# Patient Record
Sex: FEMALE | Race: WHITE | NOT HISPANIC OR LATINO | Employment: FULL TIME | URBAN - METROPOLITAN AREA
[De-identification: names, ages, dates, MRNs, and addresses within clinical notes are randomized per-mention and may not be internally consistent; named-entity substitution may affect disease eponyms.]

---

## 2017-03-03 ENCOUNTER — HOSPITAL ENCOUNTER (EMERGENCY)
Facility: HOSPITAL | Age: 52
Discharge: HOME | End: 2017-03-03

## 2017-03-03 VITALS
OXYGEN SATURATION: 92 % | BODY MASS INDEX: 27.31 KG/M2 | TEMPERATURE: 98 F | RESPIRATION RATE: 20 BRPM | SYSTOLIC BLOOD PRESSURE: 122 MMHG | WEIGHT: 160 LBS | HEART RATE: 70 BPM | DIASTOLIC BLOOD PRESSURE: 76 MMHG | HEIGHT: 64 IN

## 2017-03-03 DIAGNOSIS — S22.42XA CLOSED FRACTURE OF MULTIPLE RIBS OF LEFT SIDE, INITIAL ENCOUNTER: Primary | ICD-10-CM

## 2017-03-03 DIAGNOSIS — R07.81 RIB PAIN ON LEFT SIDE: ICD-10-CM

## 2017-03-03 PROCEDURE — 99283 EMERGENCY DEPT VISIT LOW MDM: CPT

## 2017-03-03 PROCEDURE — 25000003 PHARM REV CODE 250: Performed by: PHYSICIAN ASSISTANT

## 2017-03-03 RX ORDER — HYDROCODONE BITARTRATE AND ACETAMINOPHEN 5; 325 MG/1; MG/1
1 TABLET ORAL EVERY 6 HOURS PRN
Qty: 12 TABLET | Refills: 0 | Status: ON HOLD | OUTPATIENT
Start: 2017-03-03 | End: 2017-09-21 | Stop reason: HOSPADM

## 2017-03-03 RX ORDER — HYDROCODONE BITARTRATE AND ACETAMINOPHEN 10; 325 MG/1; MG/1
1 TABLET ORAL
Status: COMPLETED | OUTPATIENT
Start: 2017-03-03 | End: 2017-03-03

## 2017-03-03 RX ADMIN — HYDROCODONE BITARTRATE AND ACETAMINOPHEN 1 TABLET: 10; 325 TABLET ORAL at 12:03

## 2017-03-03 NOTE — ED PROVIDER NOTES
Encounter Date: 3/3/2017       History     Chief Complaint   Patient presents with    Rib Injury     states fell from 4th step and landed on left side. states she think she broke a rib     Review of patient's allergies indicates:  No Known Allergies  Patient is a 52 y.o. female presenting with the following complaint: fall. The history is provided by the patient.   Fall   The accident occurred just prior to arrival. The fall occurred while walking. She fell from a height of 1 to 2 ft. She landed on concrete. There was no blood loss. The pain is present in the chest. The pain is at a severity of 3/10. She was ambulatory at the scene. There was no entrapment after the fall. There was no drug use involved in the accident. There was no alcohol use involved in the accident. Pertinent negatives include no neck pain, no back pain, no paresthesias, no paralysis, no visual change, no fever, no numbness, no abdominal pain, no bowel incontinence, no nausea, no vomiting, no hematuria, no headaches, no hearing loss, no loss of consciousness and no tingling. The symptoms are aggravated by activity and pressure on the injury.     History reviewed. No pertinent past medical history.  Past Surgical History:   Procedure Laterality Date    HYSTERECTOMY      MANDIBLE FRACTURE SURGERY       No family history on file.  Social History   Substance Use Topics    Smoking status: Current Every Day Smoker     Packs/day: 1.00    Smokeless tobacco: None    Alcohol use Yes     Review of Systems   Constitutional: Negative for fever.   HENT: Negative for sore throat.    Eyes: Negative for photophobia and redness.   Respiratory: Negative for shortness of breath.    Cardiovascular: Negative for chest pain.   Gastrointestinal: Negative for abdominal pain, bowel incontinence, nausea and vomiting.   Endocrine: Negative for polydipsia and polyphagia.   Genitourinary: Negative for dysuria and hematuria.   Musculoskeletal: Negative for back pain and  "neck pain.   Skin: Negative for rash.   Neurological: Negative for tingling, loss of consciousness, weakness, numbness, headaches and paresthesias.   Hematological: Does not bruise/bleed easily.   Psychiatric/Behavioral: The patient is not nervous/anxious.    All other systems reviewed and are negative.      Physical Exam   Initial Vitals   BP Pulse Resp Temp SpO2   03/03/17 1222 03/03/17 1222 03/03/17 1222 03/03/17 1222 --   122/76 70 20 98 °F (36.7 °C)    No results found for this or any previous visit.   Vitals:    03/03/17 1222 03/03/17 1405   BP: 122/76    Pulse: 70    Resp: 20    Temp: 98 °F (36.7 °C)    TempSrc: Oral    SpO2:  (!) 92%   Weight: 72.6 kg (160 lb)    Height: 5' 4" (1.626 m)      Physical Exam    Nursing note and vitals reviewed.  Constitutional: Vital signs are normal. She appears well-developed and well-nourished. No distress.   HENT:   Head: Normocephalic and atraumatic.   Right Ear: External ear normal.   Left Ear: External ear normal.   Nose: Nose normal.   Mouth/Throat: Oropharynx is clear and moist.   Eyes: Conjunctivae, EOM and lids are normal. Pupils are equal, round, and reactive to light.   Neck: Normal range of motion and full passive range of motion without pain. Neck supple.   Cardiovascular: Normal rate, regular rhythm, S1 normal, S2 normal, normal heart sounds, intact distal pulses and normal pulses.   Pulmonary/Chest: Breath sounds normal. No respiratory distress. She has no wheezes. She has no rales. She exhibits tenderness and bony tenderness. She exhibits no laceration, no crepitus, no edema, no deformity, no swelling and no retraction.   Abdominal: Soft. Normal appearance and bowel sounds are normal. She exhibits no distension. There is no tenderness.   Musculoskeletal: Normal range of motion.   Lymphadenopathy:     She has no cervical adenopathy.   Neurological: She is alert and oriented to person, place, and time. She has normal strength. No cranial nerve deficit or sensory " deficit. Coordination and gait normal.   Skin: Skin is warm, dry and intact.   Psychiatric: She has a normal mood and affect. Her speech is normal and behavior is normal. Judgment and thought content normal. Cognition and memory are normal.         ED Course   Procedures  Labs Reviewed - No data to display                       Attending Attestation:     Physician Attestation Statement for NP/PA:   I discussed this assessment and plan of this patient with the NP/PA, but I did not personally examine the patient. The face to face encounter was performed by the NP/PA.                  ED Course     Clinical Impression:   The primary encounter diagnosis was Closed fracture of multiple ribs of left side, initial encounter. A diagnosis of Rib pain on left side was also pertinent to this visit.    Disposition:   Disposition: Discharged  Condition: Stable       LISA Aguirre  03/03/17 6374       Ángel Aguilera MD  03/03/17 9725

## 2017-03-03 NOTE — DISCHARGE INSTRUCTIONS
Rib Fracture    You broke one or more ribs. This is called a rib fracture. Rib fractures do not require a cast like other bones. They will heal by themselves in about 4-6 weeks. The first 3-4 weeks will be the most painful because deep breathing, coughing, or changing position from sitting to lying down, may cause the broken ends to move slightly.  Home care  · Rest. You should not be doing any heavy lifting or strenuous exertion until the pain goes away.  · It hurts to breathe when you have a broken rib. This puts you at risk of getting pneumonia from poor airflow through your lungs. To prevent this:  ¨ Take several very deep breaths once an hour while you're awake. Exhale through pursed lips as if you are blowing up a balloon. If possible, actually blow up a balloon or a rubber glove. This exercise builds up pressure inside the lung and prevents collapse of the small air sacs of the lung. This exercise may cause some pain at the site of injury, which is normal.  ¨ You may have gotten a breathing exercise device called an incentive spirometer. Use it at least 4 times a day, or as directed.  · Apply an ice pack over the injured area for 15 to 20 minutes every 1 to 2 hours. You should do this for the first 24 to 48 hours. You can make an ice pack by filling a plastic bag that seals at the top with ice cubes and then wrapping it with a thin towel. Continue with ice packs as needed for the relief of pain and swelling.  · You may use over-the-counter pain medicine to control pain, unless another pain medicine was prescribed. If you have chronic liver or kidney disease or ever had a stomach ulcer or GI bleeding, talk with your healthcare provider before using these medicines.  · If your pain is not controlled, contact your healthcare provider. Sometimes a stronger pain medicine may be needed. A nerve block can be done in case of severe pain. It will numb the nerve between the ribs.  Follow-up care  Follow up with  your healthcare provider, or as advised. Rarely, a broken rib will cause complications within the first few days that may not be evident during your initial exam. This can include collapsed lung, bleeding around the lung or into the abdomen, or pneumonia. Therefore, watch for the signs below.  If X-rays were taken, you will be told of any new findings that may affect your care.  Call 911  Call 911 if you have:  · Dizziness, weakness or fainting  · Shortness of breath with or without chest discomfort  · New or worsening abdominal pain  · Discomfort in other areas of your upper body such as your shoulders, jaw, neck, or arms  When to seek medical advice  Call your healthcare provider right away if any of these occur:  · Increasing chest pain with breathing  · Fever of 100.4°F (38°C) or above lasting for 24 to 48 hours  · Congested cough  Date Last Reviewed: 12/3/2015  © 1052-8725 [employer]. [address] [place] [address], [place] [address]. All rights reserved. This information is not intended as a substitute for professional medical care. Always follow your healthcare professional's instructions.

## 2017-03-03 NOTE — ED NOTES
Patient identifiers verified and correct for Susanne John.    LOC: The patient is awake, alert and aware of environment with an appropriate affect, the patient is oriented x 3 and speaking appropriately.  APPEARANCE: Patient resting comfortably and in no acute distress, patient is clean and well groomed, patient's clothing is properly fastened.  SKIN: The skin is warm and dry, color consistent with ethnicity, patient has normal skin turgor and moist mucus membranes, skin intact, no breakdown or bruising noted.  MUSCULOSKELETAL: Patient moving all extremities spontaneously, left sided rib pain   RESPIRATORY: Airway is open and patent, respirations are spontaneous.  CARDIAC: Patient has a normal rate, no periphreal edema noted, capillary refill < 3 seconds.  ABDOMEN: Soft and non tender to palpation.

## 2017-03-03 NOTE — ED AVS SNAPSHOT
[facility]  [address] [facility] [address]               Susanne John   3/3/2017 12:39 PM   ED    Description:  Female : 1965   Department:  [facility]            Your Care was Coordinated By:     Provider Role From To    LISA Aguirre Physician Assistant 17 0424 --      Reason for Visit     Rib Injury           Diagnoses this Visit        Comments    Closed fracture of multiple ribs of left side, initial encounter    -  Primary     Rib pain on left side           ED Disposition     ED Disposition Condition Comment    Discharge             To Do List           Follow-up Information     Follow up with Primary Doctor No In 1 week(s).       These Medications        Disp Refills Start End    hydrocodone-acetaminophen 5-325mg (NORCO) 5-325 mg per tablet 12 tablet 0 3/3/2017     Take 1 tablet by mouth every 6 (six) hours as needed for Pain. - Oral      [facility]     [facility] -  Assistance  Registered nurses in the [facility] provide clinical advisement, health education, appointment booking, and other advisory services.  Call for this free service at [phone].             Medications           START taking these NEW medications        Refills    hydrocodone-acetaminophen 5-325mg (NORCO) 5-325 mg per tablet 0    Sig: Take 1 tablet by mouth every 6 (six) hours as needed for Pain.    Class: Print    Route: Oral      These medications were administered today        Dose Freq    hydrocodone-acetaminophen 10-325mg per tablet 1 tablet 1 tablet ED 1 Time    Sig: Take 1 tablet by mouth ED 1 Time.    Class: Normal    Route: Oral    Cosign for Ordering: Required by Ángel Aguilera MD           Verify that the below list of medications is an accurate representation of the medications you are currently taking.  If none reported, the list may be blank. If incorrect, please contact your  "healthcare provider. Carry this list with you in case of emergency.           Current Medications     diclofenac (VOLTAREN) 50 MG EC tablet Take 1 tablet (50 mg total) by mouth 3 (three) times daily as needed.    hydrocodone-acetaminophen 5-325mg (NORCO) 5-325 mg per tablet Take 1 tablet by mouth every 6 (six) hours as needed for Pain.           Clinical Reference Information           Your Vitals Were     BP Pulse Temp Resp Height Weight    122/76 (BP Location: Right arm, Patient Position: Sitting) 70 98 °F (36.7 °C) (Oral) 20 5' 4" (1.626 m) 72.6 kg (160 lb)    BMI                27.46 kg/m2          Allergies as of 3/3/2017     No Known Allergies      Immunizations Administered on Date of Encounter - 3/3/2017     None      ED Micro, Lab, POCT     None      ED Imaging Orders     Start Ordered       Status Ordering Provider    03/03/17 1241 03/03/17 1240  X-Ray Ribs 2 View Left  1 time imaging      Final result         Discharge Instructions           Rib Fracture    You broke one or more ribs. This is called a rib fracture. Rib fractures do not require a cast like other bones. They will heal by themselves in about 4-6 weeks. The first 3-4 weeks will be the most painful because deep breathing, coughing, or changing position from sitting to lying down, may cause the broken ends to move slightly.  Home care  · Rest. You should not be doing any heavy lifting or strenuous exertion until the pain goes away.  · It hurts to breathe when you have a broken rib. This puts you at risk of getting pneumonia from poor airflow through your lungs. To prevent this:  ¨ Take several very deep breaths once an hour while you're awake. Exhale through pursed lips as if you are blowing up a balloon. If possible, actually blow up a balloon or a rubber glove. This exercise builds up pressure inside the lung and prevents collapse of the small air sacs of the lung. This exercise may cause some pain at the site of injury, which is " normal.  ¨ You may have gotten a breathing exercise device called an incentive spirometer. Use it at least 4 times a day, or as directed.  · Apply an ice pack over the injured area for 15 to 20 minutes every 1 to 2 hours. You should do this for the first 24 to 48 hours. You can make an ice pack by filling a plastic bag that seals at the top with ice cubes and then wrapping it with a thin towel. Continue with ice packs as needed for the relief of pain and swelling.  · You may use over-the-counter pain medicine to control pain, unless another pain medicine was prescribed. If you have chronic liver or kidney disease or ever had a stomach ulcer or GI bleeding, talk with your healthcare provider before using these medicines.  · If your pain is not controlled, contact your healthcare provider. Sometimes a stronger pain medicine may be needed. A nerve block can be done in case of severe pain. It will numb the nerve between the ribs.  Follow-up care  Follow up with your healthcare provider, or as advised. Rarely, a broken rib will cause complications within the first few days that may not be evident during your initial exam. This can include collapsed lung, bleeding around the lung or into the abdomen, or pneumonia. Therefore, watch for the signs below.  If X-rays were taken, you will be told of any new findings that may affect your care.  Call 911  Call 911 if you have:  · Dizziness, weakness or fainting  · Shortness of breath with or without chest discomfort  · New or worsening abdominal pain  · Discomfort in other areas of your upper body such as your shoulders, jaw, neck, or arms  When to seek medical advice  Call your healthcare provider right away if any of these occur:  · Increasing chest pain with breathing  · Fever of 100.4°F (38°C) or above lasting for 24 to 48 hours  · Congested cough  Date Last Reviewed: 12/3/2015  © 8666-1708 [employer]. [address] [place] [address], [place] [address]. All rights  reserved. This information is not intended as a substitute for professional medical care. Always follow your healthcare professional's instructions.          [facility] Sign-Up     Activating your [facility] account is as easy as 1-2-3!     1) Visit [email or web address], select Sign Up Now, enter this activation code and your date of birth, then select Next.  [email or web address]  Expires: 4/17/2017  1:41 PM      2) Create a username and password to use when you visit [facility] in the future and select a security question in case you lose your password and select Next.    3) Enter your e-mail address and click Sign Up!    Additional Information  If you have questions, please e-mail [email or web address] or call [phone] to talk to our [facility] staff. Remember, [facility] is NOT to be used for urgent needs. For medical emergencies, dial 911.          [facility] complies with applicable Federal civil rights laws and does not discriminate on the basis of race, color, national origin, age, disability, or sex.        Language Assistance Services     ATTENTION: Language assistance services are available, free of charge. Please call [phone].      ATENCIÓN: Si habla usama, tiene a khan disposición servicios gratuitos de asistencia lingüística. Llame al [phone].     CHÚ Ý: N?u b?n nói Ti?ng Vi?t, có các d?ch v? h? tr? ngôn ng? mi?n phí dành cho b?n. G?i s? [phone].

## 2017-09-21 ENCOUNTER — TELEPHONE (OUTPATIENT)
Dept: ORTHOPEDICS | Facility: CLINIC | Age: 52
End: 2017-09-21

## 2017-09-21 ENCOUNTER — ANESTHESIA (OUTPATIENT)
Dept: SURGERY | Facility: HOSPITAL | Age: 52
End: 2017-09-21
Payer: MEDICAID

## 2017-09-21 ENCOUNTER — ANESTHESIA EVENT (OUTPATIENT)
Dept: SURGERY | Facility: HOSPITAL | Age: 52
End: 2017-09-21
Payer: MEDICAID

## 2017-09-21 ENCOUNTER — HOSPITAL ENCOUNTER (OUTPATIENT)
Facility: HOSPITAL | Age: 52
Discharge: HOME | End: 2017-09-21
Attending: EMERGENCY MEDICINE | Admitting: ORTHOPAEDIC SURGERY
Payer: MEDICAID
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
## 2017-09-21 DIAGNOSIS — F10.10 ETOH ABUSE: ICD-10-CM

## 2017-09-21 DIAGNOSIS — T14.8XXA OPEN FRACTURE: Primary | ICD-10-CM

## 2017-09-21 LAB
ALBUMIN SERPL BCP-MCNC: 4.2 G/DL
ALP SERPL-CCNC: 43 U/L
ALT SERPL W/O P-5'-P-CCNC: 27 U/L
ANION GAP SERPL CALC-SCNC: 11 MMOL/L
APTT BLDCRRT: 25.9 SEC
AST SERPL-CCNC: 29 U/L
BASOPHILS # BLD AUTO: 0.03 K/UL
BASOPHILS NFR BLD: 0.4 %
BILIRUB SERPL-MCNC: 0.4 MG/DL
BUN SERPL-MCNC: 6 MG/DL
CALCIUM SERPL-MCNC: 9.9 MG/DL
CHLORIDE SERPL-SCNC: 101 MMOL/L
CO2 SERPL-SCNC: 26 MMOL/L
CREAT SERPL-MCNC: 0.7 MG/DL
DIFFERENTIAL METHOD: ABNORMAL
EOSINOPHIL # BLD AUTO: 0.1 K/UL
EOSINOPHIL NFR BLD: 2 %
ERYTHROCYTE [DISTWIDTH] IN BLOOD BY AUTOMATED COUNT: 14.2 %
EST. GFR  (AFRICAN AMERICAN): >60 ML/MIN/1.73 M^2
EST. GFR  (NON AFRICAN AMERICAN): >60 ML/MIN/1.73 M^2
ETHANOL SERPL-MCNC: 271 MG/DL
GLUCOSE SERPL-MCNC: 92 MG/DL
HCT VFR BLD AUTO: 45.1 %
HGB BLD-MCNC: 15.3 G/DL
INR PPP: 1
LYMPHOCYTES # BLD AUTO: 3.7 K/UL
LYMPHOCYTES NFR BLD: 51.5 %
MCH RBC QN AUTO: 33.3 PG
MCHC RBC AUTO-ENTMCNC: 33.9 G/DL
MCV RBC AUTO: 98 FL
MONOCYTES # BLD AUTO: 0.5 K/UL
MONOCYTES NFR BLD: 7.1 %
NEUTROPHILS # BLD AUTO: 2.8 K/UL
NEUTROPHILS NFR BLD: 39 %
PLATELET # BLD AUTO: 304 K/UL
PMV BLD AUTO: 10 FL
POTASSIUM SERPL-SCNC: 4 MMOL/L
PROT SERPL-MCNC: 7.8 G/DL
PROTHROMBIN TIME: 10.1 SEC
RBC # BLD AUTO: 4.6 M/UL
SODIUM SERPL-SCNC: 138 MMOL/L
WBC # BLD AUTO: 7.09 K/UL

## 2017-09-21 PROCEDURE — 80320 DRUG SCREEN QUANTALCOHOLS: CPT

## 2017-09-21 PROCEDURE — 99285 EMERGENCY DEPT VISIT HI MDM: CPT | Mod: 25

## 2017-09-21 PROCEDURE — 85730 THROMBOPLASTIN TIME PARTIAL: CPT

## 2017-09-21 PROCEDURE — 85610 PROTHROMBIN TIME: CPT

## 2017-09-21 PROCEDURE — 63600175 PHARM REV CODE 636 W HCPCS: Performed by: EMERGENCY MEDICINE

## 2017-09-21 PROCEDURE — 90375 RABIES IG IM/SC: CPT | Performed by: EMERGENCY MEDICINE

## 2017-09-21 PROCEDURE — 36000706: Performed by: ORTHOPAEDIC SURGERY

## 2017-09-21 PROCEDURE — 90675 RABIES VACCINE IM: CPT | Performed by: EMERGENCY MEDICINE

## 2017-09-21 PROCEDURE — 85025 COMPLETE CBC W/AUTO DIFF WBC: CPT

## 2017-09-21 PROCEDURE — 71000039 HC RECOVERY, EACH ADD'L HOUR: Performed by: ORTHOPAEDIC SURGERY

## 2017-09-21 PROCEDURE — 36000707: Performed by: ORTHOPAEDIC SURGERY

## 2017-09-21 PROCEDURE — G0378 HOSPITAL OBSERVATION PER HR: HCPCS

## 2017-09-21 PROCEDURE — 90471 IMMUNIZATION ADMIN: CPT | Performed by: EMERGENCY MEDICINE

## 2017-09-21 PROCEDURE — 37000008 HC ANESTHESIA 1ST 15 MINUTES: Performed by: ORTHOPAEDIC SURGERY

## 2017-09-21 PROCEDURE — 71000016 HC POSTOP RECOV ADDL HR: Performed by: ORTHOPAEDIC SURGERY

## 2017-09-21 PROCEDURE — 25000003 PHARM REV CODE 250: Performed by: EMERGENCY MEDICINE

## 2017-09-21 PROCEDURE — 96365 THER/PROPH/DIAG IV INF INIT: CPT

## 2017-09-21 PROCEDURE — 71000033 HC RECOVERY, INTIAL HOUR: Performed by: ORTHOPAEDIC SURGERY

## 2017-09-21 PROCEDURE — 90472 IMMUNIZATION ADMIN EACH ADD: CPT | Performed by: EMERGENCY MEDICINE

## 2017-09-21 PROCEDURE — S0020 INJECTION, BUPIVICAINE HYDRO: HCPCS | Performed by: ORTHOPAEDIC SURGERY

## 2017-09-21 PROCEDURE — 37000009 HC ANESTHESIA EA ADD 15 MINS: Performed by: ORTHOPAEDIC SURGERY

## 2017-09-21 PROCEDURE — 63600175 PHARM REV CODE 636 W HCPCS: Performed by: ORTHOPAEDIC SURGERY

## 2017-09-21 PROCEDURE — 90715 TDAP VACCINE 7 YRS/> IM: CPT | Performed by: EMERGENCY MEDICINE

## 2017-09-21 PROCEDURE — 63600175 PHARM REV CODE 636 W HCPCS: Performed by: NURSE ANESTHETIST, CERTIFIED REGISTERED

## 2017-09-21 PROCEDURE — 80053 COMPREHEN METABOLIC PANEL: CPT

## 2017-09-21 PROCEDURE — 25000003 PHARM REV CODE 250: Performed by: ORTHOPAEDIC SURGERY

## 2017-09-21 PROCEDURE — 25000003 PHARM REV CODE 250: Performed by: NURSE ANESTHETIST, CERTIFIED REGISTERED

## 2017-09-21 PROCEDURE — 96372 THER/PROPH/DIAG INJ SC/IM: CPT

## 2017-09-21 PROCEDURE — 63600175 PHARM REV CODE 636 W HCPCS: Performed by: ANESTHESIOLOGY

## 2017-09-21 PROCEDURE — 71000015 HC POSTOP RECOV 1ST HR: Performed by: ORTHOPAEDIC SURGERY

## 2017-09-21 PROCEDURE — 26727 TREAT FINGER FRACTURE EACH: CPT | Mod: 51,F4,, | Performed by: ORTHOPAEDIC SURGERY

## 2017-09-21 PROCEDURE — 26418 REPAIR FINGER TENDON: CPT | Mod: F4,,, | Performed by: ORTHOPAEDIC SURGERY

## 2017-09-21 PROCEDURE — C1769 GUIDE WIRE: HCPCS | Performed by: ORTHOPAEDIC SURGERY

## 2017-09-21 DEVICE — WIRE C TROCAR TIP .045
Type: IMPLANTABLE DEVICE | Site: HAND | Status: NON-FUNCTIONAL
Removed: 2017-11-06

## 2017-09-21 RX ORDER — HYDROCODONE BITARTRATE AND ACETAMINOPHEN 10; 325 MG/1; MG/1
1 TABLET ORAL EVERY 4 HOURS PRN
Qty: 42 TABLET | Refills: 0 | Status: SHIPPED | OUTPATIENT
Start: 2017-09-21 | End: 2017-10-01

## 2017-09-21 RX ORDER — DOXYCYCLINE HYCLATE 100 MG
100 TABLET ORAL EVERY 12 HOURS
Qty: 40 TABLET | Refills: 0 | Status: ON HOLD | OUTPATIENT
Start: 2017-09-21 | End: 2017-11-06 | Stop reason: ALTCHOICE

## 2017-09-21 RX ORDER — NEOMYCIN AND POLYMYXIN B SULFATES 40; 200000 MG/ML; [USP'U]/ML
SOLUTION IRRIGATION
Status: DISCONTINUED | OUTPATIENT
Start: 2017-09-21 | End: 2017-09-21 | Stop reason: HOSPADM

## 2017-09-21 RX ORDER — SODIUM CHLORIDE, SODIUM LACTATE, POTASSIUM CHLORIDE, CALCIUM CHLORIDE 600; 310; 30; 20 MG/100ML; MG/100ML; MG/100ML; MG/100ML
INJECTION, SOLUTION INTRAVENOUS CONTINUOUS PRN
Status: DISCONTINUED | OUTPATIENT
Start: 2017-09-21 | End: 2017-09-21

## 2017-09-21 RX ORDER — BUPIVACAINE HYDROCHLORIDE 5 MG/ML
INJECTION, SOLUTION EPIDURAL; INTRACAUDAL
Status: DISCONTINUED | OUTPATIENT
Start: 2017-09-21 | End: 2017-09-21 | Stop reason: HOSPADM

## 2017-09-21 RX ORDER — SODIUM CHLORIDE 9 MG/ML
INJECTION, SOLUTION INTRAVENOUS CONTINUOUS
Status: DISCONTINUED | OUTPATIENT
Start: 2017-09-21 | End: 2017-09-21 | Stop reason: HOSPADM

## 2017-09-21 RX ORDER — MIDAZOLAM HYDROCHLORIDE 1 MG/ML
INJECTION, SOLUTION INTRAMUSCULAR; INTRAVENOUS
Status: DISCONTINUED | OUTPATIENT
Start: 2017-09-21 | End: 2017-09-21

## 2017-09-21 RX ORDER — MUPIROCIN 20 MG/G
1 OINTMENT TOPICAL
Status: DISCONTINUED | OUTPATIENT
Start: 2017-09-21 | End: 2017-09-21 | Stop reason: HOSPADM

## 2017-09-21 RX ORDER — HYDROCODONE BITARTRATE AND ACETAMINOPHEN 5; 325 MG/1; MG/1
1 TABLET ORAL EVERY 4 HOURS PRN
Status: DISCONTINUED | OUTPATIENT
Start: 2017-09-21 | End: 2017-09-21 | Stop reason: HOSPADM

## 2017-09-21 RX ORDER — BACITRACIN 50000 [IU]/1
INJECTION, POWDER, FOR SOLUTION INTRAMUSCULAR
Status: DISCONTINUED | OUTPATIENT
Start: 2017-09-21 | End: 2017-09-21 | Stop reason: HOSPADM

## 2017-09-21 RX ORDER — PHENYLEPHRINE HYDROCHLORIDE 10 MG/ML
INJECTION INTRAVENOUS
Status: DISCONTINUED | OUTPATIENT
Start: 2017-09-21 | End: 2017-09-21

## 2017-09-21 RX ORDER — FENTANYL CITRATE 50 UG/ML
INJECTION, SOLUTION INTRAMUSCULAR; INTRAVENOUS
Status: DISCONTINUED | OUTPATIENT
Start: 2017-09-21 | End: 2017-09-21

## 2017-09-21 RX ORDER — SODIUM CHLORIDE 0.9 % (FLUSH) 0.9 %
3 SYRINGE (ML) INJECTION
Status: DISCONTINUED | OUTPATIENT
Start: 2017-09-21 | End: 2017-09-21 | Stop reason: HOSPADM

## 2017-09-21 RX ORDER — AMOXICILLIN AND CLAVULANATE POTASSIUM 875; 125 MG/1; MG/1
1 TABLET, FILM COATED ORAL
Status: COMPLETED | OUTPATIENT
Start: 2017-09-21 | End: 2017-09-21

## 2017-09-21 RX ORDER — PROPOFOL 10 MG/ML
INJECTION, EMULSION INTRAVENOUS
Status: DISCONTINUED | OUTPATIENT
Start: 2017-09-21 | End: 2017-09-21

## 2017-09-21 RX ORDER — SUCCINYLCHOLINE CHLORIDE 20 MG/ML
INJECTION INTRAMUSCULAR; INTRAVENOUS
Status: DISCONTINUED | OUTPATIENT
Start: 2017-09-21 | End: 2017-09-21

## 2017-09-21 RX ORDER — SODIUM CHLORIDE 0.9 % (FLUSH) 0.9 %
3 SYRINGE (ML) INJECTION EVERY 8 HOURS
Status: DISCONTINUED | OUTPATIENT
Start: 2017-09-21 | End: 2017-09-21 | Stop reason: HOSPADM

## 2017-09-21 RX ORDER — FENTANYL CITRATE 50 UG/ML
25 INJECTION, SOLUTION INTRAMUSCULAR; INTRAVENOUS EVERY 5 MIN PRN
Status: DISCONTINUED | OUTPATIENT
Start: 2017-09-21 | End: 2017-09-21 | Stop reason: HOSPADM

## 2017-09-21 RX ORDER — CEFAZOLIN SODIUM 1 G/3ML
INJECTION, POWDER, FOR SOLUTION INTRAMUSCULAR; INTRAVENOUS
Status: DISCONTINUED | OUTPATIENT
Start: 2017-09-21 | End: 2017-09-21 | Stop reason: HOSPADM

## 2017-09-21 RX ORDER — LIDOCAINE HYDROCHLORIDE 10 MG/ML
INJECTION, SOLUTION EPIDURAL; INFILTRATION; INTRACAUDAL; PERINEURAL
Status: DISCONTINUED | OUTPATIENT
Start: 2017-09-21 | End: 2017-09-21 | Stop reason: HOSPADM

## 2017-09-21 RX ORDER — LIDOCAINE HYDROCHLORIDE 10 MG/ML
10 INJECTION INFILTRATION; PERINEURAL
Status: COMPLETED | OUTPATIENT
Start: 2017-09-21 | End: 2017-09-21

## 2017-09-21 RX ORDER — HYDROMORPHONE HYDROCHLORIDE 2 MG/ML
0.2 INJECTION, SOLUTION INTRAMUSCULAR; INTRAVENOUS; SUBCUTANEOUS EVERY 5 MIN PRN
Status: DISCONTINUED | OUTPATIENT
Start: 2017-09-21 | End: 2017-09-21 | Stop reason: HOSPADM

## 2017-09-21 RX ORDER — LIDOCAINE HCL/PF 100 MG/5ML
SYRINGE (ML) INTRAVENOUS
Status: DISCONTINUED | OUTPATIENT
Start: 2017-09-21 | End: 2017-09-21

## 2017-09-21 RX ORDER — DEXAMETHASONE SODIUM PHOSPHATE 4 MG/ML
INJECTION, SOLUTION INTRA-ARTICULAR; INTRALESIONAL; INTRAMUSCULAR; INTRAVENOUS; SOFT TISSUE
Status: DISCONTINUED | OUTPATIENT
Start: 2017-09-21 | End: 2017-09-21

## 2017-09-21 RX ORDER — ONDANSETRON 2 MG/ML
4 INJECTION INTRAMUSCULAR; INTRAVENOUS DAILY PRN
Status: DISCONTINUED | OUTPATIENT
Start: 2017-09-21 | End: 2017-09-21 | Stop reason: HOSPADM

## 2017-09-21 RX ADMIN — MIDAZOLAM HYDROCHLORIDE 1 MG: 1 INJECTION, SOLUTION INTRAMUSCULAR; INTRAVENOUS at 07:09

## 2017-09-21 RX ADMIN — AMOXICILLIN AND CLAVULANATE POTASSIUM 1 TABLET: 875; 125 TABLET, FILM COATED ORAL at 04:09

## 2017-09-21 RX ADMIN — CLOSTRIDIUM TETANI TOXOID ANTIGEN (FORMALDEHYDE INACTIVATED), CORYNEBACTERIUM DIPHTHERIAE TOXOID ANTIGEN (FORMALDEHYDE INACTIVATED), BORDETELLA PERTUSSIS TOXOID ANTIGEN (GLUTARALDEHYDE INACTIVATED), BORDETELLA PERTUSSIS FILAMENTOUS HEMAGGLUTININ ANTIGEN (FORMALDEHYDE INACTIVATED), BORDETELLA PERTUSSIS PERTACTIN ANTIGEN, AND BORDETELLA PERTUSSIS FIMBRIAE 2/3 ANTIGEN 0.5 ML: 5; 2; 2.5; 5; 3; 5 INJECTION, SUSPENSION INTRAMUSCULAR at 04:09

## 2017-09-21 RX ADMIN — HYDROMORPHONE HYDROCHLORIDE 0.2 MG: 2 INJECTION, SOLUTION INTRAMUSCULAR; INTRAVENOUS; SUBCUTANEOUS at 09:09

## 2017-09-21 RX ADMIN — EPHEDRINE SULFATE 10 MG: 50 INJECTION, SOLUTION INTRAMUSCULAR; INTRAVENOUS; SUBCUTANEOUS at 08:09

## 2017-09-21 RX ADMIN — SODIUM CHLORIDE, SODIUM LACTATE, POTASSIUM CHLORIDE, AND CALCIUM CHLORIDE: 600; 310; 30; 20 INJECTION, SOLUTION INTRAVENOUS at 07:09

## 2017-09-21 RX ADMIN — RABIES IMMUNE GLOBULIN (HUMAN) 1185 UNITS: 150 INJECTION INTRAMUSCULAR at 05:09

## 2017-09-21 RX ADMIN — RABIES VACCINE 2.5 UNITS: KIT at 05:09

## 2017-09-21 RX ADMIN — LIDOCAINE HYDROCHLORIDE 10 ML: 10 INJECTION, SOLUTION INFILTRATION; PERINEURAL at 04:09

## 2017-09-21 RX ADMIN — FENTANYL CITRATE 25 MCG: 50 INJECTION, SOLUTION INTRAMUSCULAR; INTRAVENOUS at 08:09

## 2017-09-21 RX ADMIN — PIPERACILLIN SODIUM, TAZOBACTAM SODIUM 3.38 G: 3; .375 INJECTION, POWDER, LYOPHILIZED, FOR SOLUTION INTRAVENOUS at 05:09

## 2017-09-21 RX ADMIN — DEXAMETHASONE SODIUM PHOSPHATE 4 MG: 4 INJECTION, SOLUTION INTRA-ARTICULAR; INTRALESIONAL; INTRAMUSCULAR; INTRAVENOUS; SOFT TISSUE at 08:09

## 2017-09-21 RX ADMIN — EPHEDRINE SULFATE 20 MG: 50 INJECTION, SOLUTION INTRAMUSCULAR; INTRAVENOUS; SUBCUTANEOUS at 08:09

## 2017-09-21 RX ADMIN — PHENYLEPHRINE HYDROCHLORIDE 100 MCG: 10 INJECTION INTRAVENOUS at 08:09

## 2017-09-21 RX ADMIN — SUCCINYLCHOLINE CHLORIDE 120 MG: 20 INJECTION, SOLUTION INTRAMUSCULAR; INTRAVENOUS at 08:09

## 2017-09-21 RX ADMIN — PROPOFOL 20 MG: 10 INJECTION, EMULSION INTRAVENOUS at 08:09

## 2017-09-21 RX ADMIN — SODIUM CHLORIDE, SODIUM LACTATE, POTASSIUM CHLORIDE, AND CALCIUM CHLORIDE: 600; 310; 30; 20 INJECTION, SOLUTION INTRAVENOUS at 08:09

## 2017-09-21 RX ADMIN — LIDOCAINE HYDROCHLORIDE 80 MG: 20 INJECTION, SOLUTION INTRAVENOUS at 08:09

## 2017-09-21 RX ADMIN — MIDAZOLAM HYDROCHLORIDE 1 MG: 1 INJECTION, SOLUTION INTRAMUSCULAR; INTRAVENOUS at 08:09

## 2017-09-21 RX ADMIN — PROPOFOL 120 MG: 10 INJECTION, EMULSION INTRAVENOUS at 08:09

## 2017-09-21 RX ADMIN — SODIUM CHLORIDE: 0.9 INJECTION, SOLUTION INTRAVENOUS at 06:09

## 2017-09-21 RX ADMIN — PHENYLEPHRINE HYDROCHLORIDE 200 MCG: 10 INJECTION INTRAVENOUS at 08:09

## 2017-09-21 NOTE — DISCHARGE INSTRUCTIONS
General Information:    1.  Do not drink alcoholic beverages including beer for 24 hours or as long as you are on pain medication..  2.  Do not drive a motor vehicle, operate machinery or power tools, or signs legal papers for 24 hours or as long as you are on pain medication.   3.  You may experience light-headedness, dizziness, and sleepiness following surgery. Please do not stay alone. A responsible adult should be with you for this 24 hour period.  4.  Go home and rest.    5. Progress slowly to a normal diet unless instructed.  Otherwise, begin with liquids such as soft drinks, then soup and crackers working up to solid foods. Drink plenty of nonalcoholic fluids.  6.  Certain anesthetics and pain medications produce nausea and vomiting in certain       individuals. If nausea becomes a problem at home, call you doctor.    7. Several times every hour while you are awake, take 2-3 deep breaths and cough. If you had stomach surgery hold a pillow or rolled towel firmly against your stomach before you cough. This will help with any pain the cough might cause.    8. Several times every hour while you are awake, pump and flex your feet 5-6 times and do foot circles. This will help prevent blood clots.    9.Call your doctor for severe pain, bleeding, fever, or signs or symptoms of infection (pain, swelling, redness, foul odor, drainage).    10.You can contact your doctor anytime by [phone] for the [facility] (at [facility]) or [phone] for the [facility] on [facility].   [email or web address] is another way to contact your doctor if you are an active participant online with [facility].

## 2017-09-21 NOTE — TELEPHONE ENCOUNTER
Called pt to advise of post op appointment. Pt was unavailable. Left message for pt to call back at earliest convenience.

## 2017-09-21 NOTE — ED NOTES
Rabies immune globulin given via SQ around laceration per Dr. Devlin. Total of 1 ml given. 7ml left over. 3.5 ml IM given to right gluteus buddy. 3.5 ml IM given to left gluteus buddy. Patient tolerated well.

## 2017-09-21 NOTE — TRANSFER OF CARE
"Anesthesia Transfer of Care Note    Patient: Susanne John    Procedure(s) Performed: Procedure(s) (LRB):  REDUCTION WITH PINNING-CLOSED-FINGER (Left)  REPAIR- EXTENSOR TENDON (Left)  INCISION AND DRAINAGE (I & D)-HAND (Left)  REPAIR-LACERATION HAND (Left)    Patient location: PACU    Anesthesia Type: general    Transport from OR: Transported from OR on room air with adequate spontaneous ventilation    Post pain: adequate analgesia    Post assessment: no apparent anesthetic complications and tolerated procedure well    Post vital signs: stable    Level of consciousness: awake, alert and oriented    Nausea/Vomiting: no nausea/vomiting    Complications: none    Transfer of care protocol was followed      Last vitals:   Visit Vitals  /65 (BP Location: Left arm)   Pulse 77   Temp 36.7 °C (98 °F)   Resp 20   Ht 5' 2" (1.575 m)   Wt 59 kg (130 lb)   SpO2 98%   Breastfeeding? No   BMI 23.78 kg/m²     "

## 2017-09-21 NOTE — ANESTHESIA RELEASE NOTE
"Anesthesia Release from PACU Note    Patient: Susanne John    Procedure(s) Performed: Procedure(s) (LRB):  REDUCTION WITH PINNING-CLOSED-FINGER (Left)  REPAIR- EXTENSOR TENDON (Left)  INCISION AND DRAINAGE (I & D)-HAND (Left)  REPAIR-LACERATION HAND (Left)    Anesthesia type: general    Post pain: Adequate analgesia    Post assessment: no apparent anesthetic complications, tolerated procedure well and no evidence of recall    Last Vitals:   Visit Vitals  /86   Pulse 92   Temp 36.4 °C (97.5 °F) (Temporal)   Resp 12   Ht 5' 2" (1.575 m)   Wt 59 kg (130 lb)   SpO2 99%   Breastfeeding? No   BMI 23.78 kg/m²       Post vital signs: stable    Level of consciousness: awake, alert  and oriented    Nausea/Vomiting: no nausea/no vomiting    Complications: none    Airway Patency: patent    Respiratory: unassisted, spontaneous ventilation, room air    Cardiovascular: stable and blood pressure at baseline    Hydration: euvolemic  "

## 2017-09-21 NOTE — DISCHARGE SUMMARY
[facility]  Brief Operative Note     SUMMARY     Surgery Date: 9/21/2017     Surgeon(s) and Role:     * Florencio Huerta Sr., MD - Primary    Assisting Surgeon: None    Pre-op Diagnosis:  Open fracture [T14.8]  Dog bite, initial encounter [W54.0XXA]    Post-op Diagnosis:  Post-Op Diagnosis Codes:     * Open fracture [T14.8]     * Dog bite, initial encounter [W54.0XXA]  Left fifth finger proximal phalanx open fracture, laceration to the left hand measuring 8 cm. laceration to the fifth extensor tendon     Procedure(s) (LRB):  REDUCTION WITH PINNING-CLOSED-FINGER (Left)  REPAIR- EXTENSOR TENDON (Left)  INCISION AND DRAINAGE (I & D)-HAND (Left)  REPAIR-LACERATION HAND (Left)    Anesthesia: General    Description of the findings of the procedure:  Left fifth finger proximal phalanx open fracture, laceration to the left hand measuring 8 cm. laceration to the fifth extensor tendon     Findings/Key Components: Left fifth finger proximal phalanx open fracture, laceration to the left hand measuring 8 cm. laceration to the fifth extensor tendon      Estimated Blood Loss: 2 cc         Specimens:   Specimen (12h ago through future)    None          Discharge Note    SUMMARY     Admit Date: 9/21/2017    Discharge Date and Time:  09/21/2017 9:01 AM    Hospital Course (synopsis of major diagnoses, care, treatment, and services provided during the course of the hospital stay): The patient tolerated the procedure well.      Final Diagnosis: Post-Op Diagnosis Codes:     * Open fracture [T14.8]     * Dog bite, initial encounter [W54.0XXA]  Left fifth finger proximal phalanx open fracture, laceration to the left hand measuring 8 cm. laceration to the fifth extensor tendon     Disposition: Home or Self Care    Follow Up/Patient Instructions: Follow-up in 4 days.  Elevate the hand to 5 cm above the level of the heart.  Leave the dressings intact.  Resume regular diet.    Medications: Norco  Reconciled Home Medications:    Current Discharge Medication List      START taking these medications    Details   doxycycline (VIBRA-TABS) 100 MG tablet Take 1 tablet (100 mg total) by mouth every 12 (twelve) hours.  Qty: 40 tablet, Refills: 0      hydrocodone-acetaminophen 10-325mg (NORCO)  mg Tab Take 1 tablet by mouth every 4 (four) hours as needed.  Qty: 42 tablet, Refills: 0         CONTINUE these medications which have NOT CHANGED    Details   diclofenac (VOLTAREN) 50 MG EC tablet Take 1 tablet (50 mg total) by mouth 3 (three) times daily as needed.  Qty: 14 tablet, Refills: 0         STOP taking these medications       hydrocodone-acetaminophen 5-325mg (NORCO) 5-325 mg per tablet Comments:   Reason for Stopping:               Discharge Procedure Orders  Diet general     Call MD for:  temperature >100.4     Call MD for:  persistent nausea and vomiting     Call MD for:  severe uncontrolled pain     Call MD for:  difficulty breathing, headache or visual disturbances     Call MD for:  redness, tenderness, or signs of infection (pain, swelling, redness, odor or green/yellow discharge around incision site)     Call MD for:  hives     Call MD for:  persistent dizziness or light-headedness     Call MD for:  extreme fatigue     Other restrictions (specify):   Order Comments: Keep the splint in place.  Keep the splint clean and dry.  Elevate the hand to 5 cm above the level of the heart.  Follow up on Tuesday.     Leave dressing on - Keep it clean, dry, and intact until clinic visit       Follow-up Information     Call Florencio Huerta Sr, MD.    Specialty:  Orthopedic Surgery  Why:  As needed, If symptoms worsen, For suture removal, For wound re-check  Contact information:  [address] [facility] [place] [address] [phone]

## 2017-09-21 NOTE — PLAN OF CARE
Home care instructions given to patient and friend, verbalized understanding.  All questions answered to the satisfaction of patient and friend.  To POV via WC, into POV without difficulty, distress or assist.

## 2017-09-21 NOTE — PLAN OF CARE
Ambulated well in ed room. Transferred self from stretcher to stretcher without difficulty or weakness. Pt reports 10/10 pain level due to wash out of wounds in the ed. She will need to sign consents and verb understanding that she will feel better after surgery. She also requests  consult so she can apply for medicaid.

## 2017-09-21 NOTE — OP NOTE
OPERATIVE DICTATION:    DATE OF SERVICE: 9/21/17      Preoperative Diagnosis:  Left fifth finger proximal phalanx open fracture, laceration to the left hand measuring 8 cm.    Postoperative Diagnosis:  Left fifth finger proximal phalanx open fracture, laceration to the left hand measuring 8 cm. laceration to the fifth extensor tendon     Procedure: Left fifth proximal phalanx open reduction and percutaneous pin fixation, repair of complex laceration 8 cm, repair fifth extensor tendon    Indication for surgery: Left finger laceration and open fracture of the left proximal phalanx fifth digit.    Anesthesia: Gen. anesthesia     Complications: None     Surgeon: Florencio Huerta M.D.     Specimen: None    Assistant: None      Operative procedure:  The patient was brought to operating room after appropriate consent and placed under general anesthesia with intubation.  The timeout was performed and the correct extremity identified.  The left upper extremity was prepped with alcohol chlorhexidine and sterilely draped.  The Esmarch used for exsanguination and the tourniquet inflated to 250 mmHg pressure.  The patient had a 6 cm laceration over the ulnar aspect of the fifth digit as well as 31 cm laceration over the dorsal radial aspect of the fifth digit.  The pulse lavage with 1 L of TAB solution was used to irrigate the area.  Patient noted to have a small 1 cm tear of the extensor tendon of the fifth digit that was repaired using interrupted 3-0 Vicryl suture.  The fracture was reduced and the pins were placed through the skin across the fracture site under fluoroscopic guidance with the bone reduction forceps in place.  The quarter inch iodoform gauze was packed into the wound to allow drainage.  The wound was closed using interrupted 3-0 nylon sutures.  The pins were cut outside of the skin.  Betadine applied cast padding applied and the patient placed in a volar splint with the wrist in extension and the fifth digit  in extension.  The tourniquet deflated.                 Florencio Huerta M.D.

## 2017-09-21 NOTE — H&P
"CC:52 year old female complains of left hand pain.    HPI:She was bitten by a Pitbull.    PMH:  History reviewed. No pertinent past medical history.    PSH:    Past Surgical History:   Procedure Laterality Date    HYSTERECTOMY      MANDIBLE FRACTURE SURGERY         Family Hx:  History reviewed. No pertinent family history.    Allergy:  Review of patient's allergies indicates:  No Known Allergies    Medication:    Current Facility-Administered Medications:     0.9%  NaCl infusion, , Intravenous, Continuous, Rubin Marsh MD, Stopped at 09/21/17 0700    lorazepam (ATIVAN) injection 1 mg, 1 mg, Intravenous, Q2H PRN, Rubin Marsh MD    Current Outpatient Prescriptions:     diclofenac (VOLTAREN) 50 MG EC tablet, Take 1 tablet (50 mg total) by mouth 3 (three) times daily as needed., Disp: 14 tablet, Rfl: 0    hydrocodone-acetaminophen 5-325mg (NORCO) 5-325 mg per tablet, Take 1 tablet by mouth every 6 (six) hours as needed for Pain., Disp: 12 tablet, Rfl: 0    Social History:    Social History     Social History    Marital status: Single     Spouse name: N/A    Number of children: N/A    Years of education: N/A     Occupational History    Not on file.     Social History Main Topics    Smoking status: Current Every Day Smoker     Packs/day: 2.00    Smokeless tobacco: Not on file    Alcohol use Yes    Drug use: No    Sexual activity: Not on file     Other Topics Concern    Not on file     Social History Narrative    No narrative on file       Vitals:   /65 (BP Location: Left arm)   Pulse 77   Temp 98 °F (36.7 °C)   Resp 20   Ht 5' 2" (1.575 m)   Wt 59 kg (130 lb)   SpO2 98%   Breastfeeding? No   BMI 23.78 kg/m²      ROS:  GENERAL: No fever, chills, fatigability or weight loss.  SKIN: No rashes, itching or changes in color or texture of skin.  HEAD: No headaches or recent head trauma.  EYES: Visual acuity fine. No photophobia, ocular pain or diplopia.  EARS: Denies ear pain, " discharge or vertigo.  NOSE: No loss of smell, no epistaxis or postnasal drip.  MOUTH & THROAT: No hoarseness or change in voice. No excessive gum bleeding.  NODES: Denies swollen glands.  CHEST: Denies ORO, cyanosis, wheezing, cough and sputum production.  CARDIOVASCULAR: Denies chest pain, PND, orthopnea or reduced exercise tolerance.  ABDOMEN: Appetite fine. No weight loss. Denies diarrhea, abdominal pain, hematemesis or blood in stool.  URINARY: No flank pain, dysuria or hematuria.  PERIPHERAL VASCULAR: No claudication or cyanosis.  NEUROLOGIC: No history of seizures, paralysis, alteration of gait or coordination.  MUSCULOSKELETAL: See HPI    PE:  APPEARANCE: Well nourished, well developed, in no acute distress.   HEAD: Normocephalic, atraumatic.  EYES: PERRL. EOMI.   EARS: TM's intact. Light reflex normal. No retraction or perforation.   NOSE: Mucosa pink. Airway clear.  MOUTH & THROAT: No tonsillar enlargement. No pharyngeal erythema or exudate. No stridor.  NECK: Supple.   NODES: No cervical, axillary or inguinal lymph node enlargement.  CHEST: Lungs clear to auscultation.  CARDIOVASCULAR: Normal S1, S2. No rubs, murmurs or gallops.  ABDOMEN: Bowel sounds normal. Not distended. Soft. No tenderness or masses.  NEUROLOGIC: Cranial Nerves: II-XII grossly intact, also see MUSCULOSKELETAL  MUSCULOSKELETAL:          Left hand- Dressing intact, 2 plus radial  artery and ulnar artery pulses, light touch intact Left upper extremity.  All digits are warm. No erythema, no warmth, no drainage, No swelling, no significant tenderness. 6 cm laceration over the ulnar aspect of the fifth digit and a 1 cm laceration over the redial side of the fifth digit. Decreased light touch over the tip of the left fifth digit.            Assessment:           Diagnosis:              1.laceration of the left fifth digit with an open fracture of the proximal phalanx                   Diagnostic  Studies  MRI-No  X-Ray-No  EMG/NCV-No  Arthrogram-No  Bone Scan-No  CT Scan-No  Doppler-No  ESR-No  CRP-No  CBC with Diff-No   Rheumatoid/Arthritis Panel-No      Plan:                                                 1. PT-no                                                 2.OT-no                                          3.NSAID-no                                        4. Narcotics-no                                     5. Wound care-Yes                                 6. Rest-yes                                           7. Surgery-yes, irrigation and debridement of the left fifth finger with pinning of the fifth proximal phalanx                                         8. MAREN Hose-no                                    9. Anticoagulation therapy-no               10. Elevation-yes                                     11. Crutches-no                                    12. Walker-no             13. Cane no                        14. Referral-no                                     15.Injection-no                            16. Splint   /    Cast   /   Cast Shoe-Yes              17. RICE-none

## 2017-09-21 NOTE — ANESTHESIA PREPROCEDURE EVALUATION
09/21/2017  Susanne John is a 52 y.o., female.    Anesthesia Evaluation    I have reviewed the Patient Summary Reports.    I have reviewed the Nursing Notes.      Review of Systems  Anesthesia Hx:  No problems with previous Anesthesia    Social:  Smoker  Tobacco Use: Active smoker of cigarette for 30 years, 2 packs per day, Smoking Cessation discussion.       Physical Exam  General:  Well nourished    Airway/Jaw/Neck:  Airway Findings: Mouth Opening: Normal Mallampati: III  Improves to II with phonation.  TM Distance: Normal, at least 6 cm      Dental:  Dental Findings:    Chest/Lungs:  Chest/Lungs Findings: Normal Respiratory Rate, Clear to auscultation     Heart/Vascular:  Heart Findings: Rate: Normal  Rhythm: Regular Rhythm  Sounds: Normal        Mental Status:  Mental Status Findings:  Cooperative, Alert and Oriented         Anesthesia Plan  Type of Anesthesia, risks & benefits discussed:  Anesthesia Type:  general  Patient's Preference:   Intra-op Monitoring Plan: standard ASA monitors  Intra-op Monitoring Plan Comments:   Post Op Pain Control Plan:   Post Op Pain Control Plan Comments:   Induction:   IV  Beta Blocker:  Patient is not currently on a Beta-Blocker (No further documentation required).       Informed Consent: Patient understands risks and agrees with Anesthesia plan.  Questions answered. Anesthesia consent signed with patient.  ASA Score: 2     Day of Surgery Review of History & Physical: I have interviewed and examined the patient. I have reviewed the patient's H&P dated:            Ready For Surgery From Anesthesia Perspective.

## 2017-09-21 NOTE — PLAN OF CARE
Pt c/o lt hand pain 8/10, iv pain med being administered. Neurovascular checks intact. VSS. Will cont to monitor. See flow sheet for detailed assessment.

## 2017-09-21 NOTE — ED PROVIDER NOTES
SCRIBE #1 NOTE: I, Herman Webster, am scribing for, and in the presence of, Joy Griffith DO. I have scribed the entire note.     SCRIBE #2 NOTE: I, Corinne Mack, am scribing for, and in the presence of, Rubin Marsh MD.     History      Chief Complaint   Patient presents with    Animal Bite     Patient states she was pulling apart two fighting dogs when she was bitten.  Multiple large, bleeding lacerations noted to left hand.       Review of patient's allergies indicates:  No Known Allergies     HPI   HPI    9/21/2017, 4:52 AM   History obtained from the patient      History of Present Illness: Susanne John is a 52 y.o. female patient who presents to the Emergency Department for laceration which onset gradually three hours pta. Pt reports breaking up a fight between her friend's pit bulls. Pt used her L hand to break up the dog fight. Pt believes the two dogs are not UTD on their shots. Pt states she wants to receive rabies vaccination. Symptoms are constant and moderate in severity. No mitigating or exacerbating factors reported. No associated sxs reported. Patient denies any fever, chills, diaphoresis, n/v, HA, light-headedness, and all other sxs at this time. No prior Tx reported. No further complaints or concerns at this time.         Arrival mode: Personal vehicle    PCP: Primary Doctor No       Past Medical History:  Past medical hx reviewed not pertinent.     Past Surgical History:  Past Surgical History:   Procedure Laterality Date    HYSTERECTOMY      MANDIBLE FRACTURE SURGERY           Family History:  Family hx reviewed not pertinent.     Social History:  Social History     Social History Main Topics    Smoking status: Current Every Day Smoker     Packs/day: 2.00    Smokeless tobacco: Unknown    Alcohol use Yes    Drug use: No    Sexual activity: Unknown       ROS   Review of Systems   Constitutional: Negative for chills, diaphoresis and fever.   HENT: Negative for sore throat.     Respiratory: Negative for shortness of breath.    Cardiovascular: Negative for chest pain.   Gastrointestinal: Negative for nausea and vomiting.   Genitourinary: Negative for dysuria and hematuria.   Musculoskeletal: Negative for back pain.   Skin: Positive for wound (lac). Negative for rash.   Neurological: Negative for dizziness, weakness, light-headedness, numbness and headaches.   Hematological: Does not bruise/bleed easily.   All other systems reviewed and are negative.    Physical Exam      Initial Vitals [09/21/17 0442]   BP Pulse Resp Temp SpO2   115/83 80 20 98 °F (36.7 °C) 95 %      MAP       93.67          Physical Exam  Nursing Notes and Vital Signs Reviewed.  Constitutional: Patient is in no acute distress. Well-developed and well-nourished.  Head: Atraumatic. Normocephalic.  Eyes: PERRL. EOM intact. Conjunctivae are not pale. No scleral icterus.  ENT: Mucous membranes are moist. Oropharynx is clear and symmetric.    Neck: Supple. Full ROM. No lymphadenopathy.  Cardiovascular: Regular rate. Regular rhythm. No murmurs, rubs, or gallops. Distal pulses are 2+ and symmetric.  Pulmonary/Chest: No respiratory distress. Clear to auscultation bilaterally. No wheezing, rales, or rhonchi.  Abdominal: Soft and non-distended.  There is no tenderness.  No rebound, guarding, or rigidity. Good bowel sounds.  Genitourinary: No CVA tenderness  Musculoskeletal: Moves all extremities. No obvious deformities. No edema. No calf tenderness.  Skin: Warm and dry. 5 cm lac L lat hand extending 5th metacarpal distal to the P.I.P. Joint and the proximal phalangeal. 1 cm lac over L 4th metacarpal.    Neurological:  Alert, awake, and appropriate.  Normal speech.  No acute focal neurological deficits are appreciated.  Psychiatric: Normal affect. Good eye contact. Appropriate in content.        ED Course    Critical Care  Date/Time: 9/21/2017 6:33 AM  Performed by: NATALIE ESPAÑA  Authorized by: NATALIE ESPAÑA   Direct  "patient critical care time: 10 minutes  Additional history critical care time: 5 minutes  Ordering / reviewing critical care time: 5 minutes  Documentation critical care time: 5 minutes  Consulting other physicians critical care time: 10 minutes  Total critical care time (exclusive of procedural time) : 35 minutes  Critical care time was exclusive of teaching time and separately billable procedures and treating other patients.  Critical care was necessary to treat or prevent imminent or life-threatening deterioration of the following conditions: open fracture.  Critical care was time spent personally by me on the following activities: discussions with consultants, evaluation of patient's response to treatment, examination of patient, obtaining history from patient or surrogate, ordering and performing treatments and interventions, ordering and review of laboratory studies, ordering and review of radiographic studies, pulse oximetry, re-evaluation of patient's condition and review of old charts.        ED Vital Signs:  Vitals:    09/21/17 0442   BP: 115/83   Pulse: 80   Resp: 20   Temp: 98 °F (36.7 °C)   SpO2: 95%   Weight: 59 kg (130 lb)   Height: 5' 2" (1.575 m)       Abnormal Lab Results:  Labs Reviewed   ALCOHOL,MEDICAL (ETHANOL) - Abnormal; Notable for the following:        Result Value    Alcohol, Medical, Serum 271 (*)     All other components within normal limits   COMPREHENSIVE METABOLIC PANEL - Abnormal; Notable for the following:     Alkaline Phosphatase 43 (*)     All other components within normal limits   PROTIME-INR   APTT   CBC W/ AUTO DIFFERENTIAL        All Lab Results:  Results for orders placed or performed during the hospital encounter of 09/21/17   Ethanol   Result Value Ref Range    Alcohol, Medical, Serum 271 (H) <10 mg/dL   Protime-INR   Result Value Ref Range    Prothrombin Time 10.1 9.0 - 12.5 sec    INR 1.0 0.8 - 1.2   APTT   Result Value Ref Range    aPTT 25.9 21.0 - 32.0 sec "   Comprehensive metabolic panel   Result Value Ref Range    Sodium 138 136 - 145 mmol/L    Potassium 4.0 3.5 - 5.1 mmol/L    Chloride 101 95 - 110 mmol/L    CO2 26 23 - 29 mmol/L    Glucose 92 70 - 110 mg/dL    BUN, Bld 6 6 - 20 mg/dL    Creatinine 0.7 0.5 - 1.4 mg/dL    Calcium 9.9 8.7 - 10.5 mg/dL    Total Protein 7.8 6.0 - 8.4 g/dL    Albumin 4.2 3.5 - 5.2 g/dL    Total Bilirubin 0.4 0.1 - 1.0 mg/dL    Alkaline Phosphatase 43 (L) 55 - 135 U/L    AST 29 10 - 40 U/L    ALT 27 10 - 44 U/L    Anion Gap 11 8 - 16 mmol/L    eGFR if African American >60 >60 mL/min/1.73 m^2    eGFR if non African American >60 >60 mL/min/1.73 m^2         Imaging Results:  Imaging Results          X-Ray Hand 3 view Left (In process)                         The Emergency Provider reviewed the vital signs and test results, which are outlined above.    ED Discussion     5:17 AM: Pharmacy is looking for the immunoglobulin.     5:50 AM: IVIG administered at this time around the wound.     6:01 AM: Dr. Griffith transfers care of pt to Dr. Marsh, pending lab results.    6:14 AM: Pt last ate at around 9:00 PM last night. Pt last drank PTA.    6:22 AM: Dr. Marsh discussed the pt's case with Dr. Huerta (Orthopedics) who recommends placing the pt in Obs and having the pt's wound cleaned in the OR.    6:30 AM: Discussed case with Dr. Huerta (Orthopedics). Dr. Huerta agrees with current care and management of pt and accepts admission.   Admitting Service: Orthopedics  Admitting Physician: Dr. Huerta  Admit to: Obs    6:35 AM: Re-evaluated pt. I have discussed test results, shared treatment plan, and the need for admission with patient and family at bedside. Pt and family express understanding at this time and agree with all information. All questions answered. Pt and family have no further questions or concerns at this time. Pt is ready for admit.        ED Medication(s):  Medications   lidocaine HCL 10 mg/ml (1%) injection 10 mL (10 mLs  Infiltration Given 9/21/17 0456)   Tdap vaccine injection 0.5 mL (0.5 mLs Intramuscular Given 9/21/17 0456)   amoxicillin-clavulanate 875-125mg per tablet 1 tablet (1 tablet Oral Given 9/21/17 0456)   rabies immune globulin (PF) injection 1,185 Units (1,185 Units Intramuscular Given 9/21/17 0556)   rabies vaccine, PCEC injection 2.5 Units (2.5 Units Intramuscular Given 9/21/17 0516)   piperacillin-tazobactam 3.375 g in dextrose 5 % 50 mL IVPB (ready to mix system) (0 g Intravenous Stopped 9/21/17 0612)       New Prescriptions    No medications on file             Medical Decision Making    Medical Decision Making:   Clinical Tests:   Lab Tests: Ordered and Reviewed  Radiological Study: Ordered and Reviewed           Scribe Attestation:   Scribe #1: I performed the above scribed service and the documentation accurately describes the services I performed. I attest to the accuracy of the note.    Attending:   Physician Attestation Statement for Scribe #1: I, Joy Griffith DO, personally performed the services described in this documentation, as scribed by Herman Webster, in my presence, and it is both accurate and complete.       Scribe Attestation:   Scribe #2: I performed the above scribed service and the documentation accurately describes the services I performed. I attest to the accuracy of the note.    Attending Attestation:           Physician Attestation for Scribe:    Physician Attestation Statement for Scribe #2: I, Rubin Marsh MD, reviewed documentation, as scribed by Corinne Mack in my presence, and it is both accurate and complete. I also acknowledge and confirm the content of the note done by Scribe #1.          Clinical Impression       ICD-10-CM ICD-9-CM   1. Open fracture T14.8 829.1   2. Dog bite, initial encounter W54.0XXA 879.8     E906.0   3. ETOH abuse F10.10 305.00       Disposition:   Disposition: Placed in Observation  Condition: Fair         Joy Griffith DO  09/21/17 1950

## 2017-09-21 NOTE — TELEPHONE ENCOUNTER
----- Message from Destin Howell sent at 9/21/2017 10:54 AM CDT -----  Contact: Maribel from PACU  Caller request call to get pt a post op apt for surgery before 10-05-17 apt, please contact pt at [phone]

## 2017-09-26 ENCOUNTER — TELEPHONE (OUTPATIENT)
Dept: ORTHOPEDICS | Facility: CLINIC | Age: 52
End: 2017-09-26

## 2017-09-26 NOTE — TELEPHONE ENCOUNTER
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 Pt is requesting this excuse to be FAX to pt Job [phone] to be advised.  Pt call back [phone] thanks

## 2017-10-03 VITALS
OXYGEN SATURATION: 99 % | SYSTOLIC BLOOD PRESSURE: 123 MMHG | HEART RATE: 92 BPM | HEIGHT: 62 IN | DIASTOLIC BLOOD PRESSURE: 86 MMHG | BODY MASS INDEX: 23.92 KG/M2 | TEMPERATURE: 98 F | RESPIRATION RATE: 12 BRPM | WEIGHT: 130 LBS

## 2017-10-04 ENCOUNTER — TELEPHONE (OUTPATIENT)
Dept: ORTHOPEDICS | Facility: CLINIC | Age: 52
End: 2017-10-04

## 2017-10-04 NOTE — TELEPHONE ENCOUNTER
----- Message from Elliot Vega sent at 10/4/2017  2:31 PM CDT -----  Pt is requesting a call from nurse to discuss weather she can come in at 4 pm for her apt on 10/5/2017        Please call pt back at [phone]

## 2017-10-05 ENCOUNTER — OFFICE VISIT (OUTPATIENT)
Dept: ORTHOPEDICS | Facility: CLINIC | Age: 52
End: 2017-10-05
Payer: MEDICAID

## 2017-10-05 VITALS
DIASTOLIC BLOOD PRESSURE: 81 MMHG | RESPIRATION RATE: 18 BRPM | HEIGHT: 62 IN | SYSTOLIC BLOOD PRESSURE: 134 MMHG | HEART RATE: 82 BPM | WEIGHT: 121 LBS | BODY MASS INDEX: 22.26 KG/M2

## 2017-10-05 DIAGNOSIS — T84.84XA PAINFUL ORTHOPAEDIC HARDWARE: Primary | ICD-10-CM

## 2017-10-05 DIAGNOSIS — M79.642 LEFT HAND PAIN: Primary | ICD-10-CM

## 2017-10-05 DIAGNOSIS — Z98.890 POST-OPERATIVE STATE: Primary | ICD-10-CM

## 2017-10-05 PROCEDURE — 99024 POSTOP FOLLOW-UP VISIT: CPT | Mod: ,,, | Performed by: ORTHOPAEDIC SURGERY

## 2017-10-05 PROCEDURE — 99999 PR PBB SHADOW E&M-EST. PATIENT-LVL III: CPT | Mod: PBBFAC,,, | Performed by: ORTHOPAEDIC SURGERY

## 2017-10-05 PROCEDURE — 99213 OFFICE O/P EST LOW 20 MIN: CPT | Mod: PBBFAC,PO | Performed by: ORTHOPAEDIC SURGERY

## 2017-10-05 RX ORDER — TRAMADOL HYDROCHLORIDE 50 MG/1
50 TABLET ORAL EVERY 4 HOURS PRN
Qty: 20 TABLET | Refills: 0 | Status: SHIPPED | OUTPATIENT
Start: 2017-10-05 | End: 2017-10-15

## 2017-10-06 PROBLEM — Z98.890 POST-OPERATIVE STATE: Status: ACTIVE | Noted: 2017-10-06

## 2017-10-06 NOTE — PATIENT INSTRUCTIONS
Dog Bite  A dog bite can cause a wound deep enough to break the skin. In such cases, the wound is cleaned and sometimes closed. If the wound is closed, it is usually not completely closed. This is so that fluid can drain if the wound becomes infected. Often, wounds will be left open to heal. In addition to wound care, a tetanus shot may be given, if needed.    Home care  · Wash your hands well with soap and warm water before and after caring for the wound. This helps lower the risk of infection.  · Care for the wound as directed. If a dressing was applied to the wound, be sure to change it as directed.  · If the wound bleeds, place a clean, soft cloth on the wound. Then firmly apply pressure until the bleeding stops. This may take up to 5 minutes. Do not release the pressure and look at the wound during this time.  · Most wounds heal within 10 days. But an infection can occur even with proper treatment. So be sure to check the wound daily for signs of infection (see below).  · Antibiotics may be prescribed. These help prevent or treat infection. If youre given antibiotics, take them as directed. Also be sure to complete the medicines.  Rabies prevention  Rabies is a virus that can be carried in certain animals. These can include domestic animals such as dogs and cats. Pets fully vaccinated against rabies (2 shots) are at very low risk of infection. But because human rabies is almost always fatal, any biting pet should be confined for 10 days as an extra precaution. In general, if there is a risk for rabies, the following steps may need to be taken:  · If someones pet dog has bitten you, it should be kept in a secure area for the next 10 days to watch for signs of illness. (If the pet owner wont allow this, contact your local animal control center.) If the dog becomes ill or dies during that time, contact your local animal control center at once so the animal may be tested for rabies. If the dog stays healthy  for the next 10 days, there is no danger of rabies in the animal or you.  ¨ If a stray dog bit you, contact your local animal control center. They can give information on capture, quarantine, and animal rabies testing.  ¨ If you cant find the animal that bit you in the next 2 days, and if rabies exists in your area, you may need to receive the rabies vaccine series. Call your healthcare provider right away. Or, return to the emergency department promptly.  ¨ All animal bites should be reported to the local animal control center. If you were not given a form to fill out, you can report this yourself.  Follow-up care  Follow up with your healthcare provider, or as directed.  When to seek medical advice  Call your healthcare provider right away if any of these occur:  · Signs of infection:  ¨ Spreading redness or warmth from the wound  ¨ Increased pain or swelling  ¨ Fever of 100.4ºF (38ºC) or higher, or as directed by your healthcare provider  ¨ Colored fluid or pus draining from the wound  · Signs of rabies infection:  ¨ Headache  ¨ Confusion  ¨ Strange behavior  ¨ Increased salivating and drooling  ¨ Seizure  · Decreased ability to move any body part near the wound  · Bleeding that can't be stopped after 5 minutes of firm pressure  Date Last Reviewed: 3/1/2017  © 1048-6797 [employer]. [address] [place] [address], [place] [address]. All rights reserved. This information is not intended as a substitute for professional medical care. Always follow your healthcare professional's instructions.

## 2017-10-06 NOTE — PROGRESS NOTES
"SUBJECTIVE:  Patient is status post Left fifth finger pinning  .  Patient complains of   3/10 pain that is better with the  pain meds and aggravated with movement.  History reviewed. No pertinent past medical history.  Past Surgical History:   Procedure Laterality Date    HYSTERECTOMY      MANDIBLE FRACTURE SURGERY       Review of patient's allergies indicates:  No Known Allergies  Current Outpatient Prescriptions on File Prior to Visit   Medication Sig Dispense Refill    diclofenac (VOLTAREN) 50 MG EC tablet Take 1 tablet (50 mg total) by mouth 3 (three) times daily as needed. 14 tablet 0    doxycycline (VIBRA-TABS) 100 MG tablet Take 1 tablet (100 mg total) by mouth every 12 (twelve) hours. 40 tablet 0     No current facility-administered medications on file prior to visit.      /81   Pulse 82   Resp 18   Ht 5' 2" (1.575 m)   Wt 54.9 kg (121 lb)   BMI 22.13 kg/m²    ROS:  GENERAL: No fever, chills, fatigability or weight loss.  SKIN: No rashes, itching or changes in color or texture of skin.  HEAD: No headaches or recent head trauma.  EYES: Visual acuity fine. No photophobia, ocular pain or diplopia.  EARS: Denies ear pain, discharge or vertigo.  NOSE: No loss of smell, no epistaxis or postnasal drip.  MOUTH & THROAT: No hoarseness or change in voice. No excessive gum bleeding.  NODES: Denies swollen glands.  CHEST: Denies ORO, cyanosis, wheezing, cough and sputum production.  CARDIOVASCULAR: Denies chest pain, PND, orthopnea or reduced exercise tolerance.  ABDOMEN: Appetite fine. No weight loss. Denies diarrhea, abdominal pain, hematemesis or blood in stool.  URINARY: No flank pain, dysuria or hematuria.  PERIPHERAL VASCULAR: No claudication or cyanosis.  NEUROLOGIC: No history of seizures, paralysis, alteration of gait or coordination.  MUSCULOSKELETAL: See HPI    PE:  APPEARANCE: Well nourished, well developed, in no acute distress.   HEAD: Normocephalic, atraumatic.  EYES: PERRL. EOMI.   EARS: " TM's intact. Light reflex normal. No retraction or perforation.   NOSE: Mucosa pink. Airway clear.  MOUTH & THROAT: No tonsillar enlargement. No pharyngeal erythema or exudate. No stridor.  NECK: Supple.   NODES: No cervical, axillary or inguinal lymph node enlargement.  CHEST: Lungs clear to auscultation.  CARDIOVASCULAR: Normal S1, S2. No rubs, murmurs or gallops.  ABDOMEN: Bowel sounds normal. Not distended. Soft. No tenderness or masses.  NEUROLOGIC: Cranial Nerves: II-XII grossly intact, also see MUSCULOSKELETAL  MUSCULOSKELETAL:     Left hand Dressing intact, 2 plus radial  artery and ulnar artery pulses, light touch intact Left upper extremity.  All digits are warm. No erythema, no warmth, no drainage, No swelling, no significant tenderness.      ASSESSMENT:  The patient is stable and improving.      PLAN:  Hardware removal, soon  Continue pain medication  Continue wound care  Continue occupational therapy

## 2017-10-16 ENCOUNTER — TELEPHONE (OUTPATIENT)
Dept: ORTHOPEDICS | Facility: CLINIC | Age: 52
End: 2017-10-16

## 2017-10-16 DIAGNOSIS — Z01.818 PRE-OP TESTING: Primary | ICD-10-CM

## 2017-10-24 ENCOUNTER — OFFICE VISIT (OUTPATIENT)
Dept: INTERNAL MEDICINE | Facility: CLINIC | Age: 52
End: 2017-10-24
Payer: MEDICAID

## 2017-10-24 VITALS
HEIGHT: 62 IN | SYSTOLIC BLOOD PRESSURE: 122 MMHG | HEART RATE: 89 BPM | RESPIRATION RATE: 20 BRPM | WEIGHT: 124.69 LBS | DIASTOLIC BLOOD PRESSURE: 80 MMHG | OXYGEN SATURATION: 98 % | BODY MASS INDEX: 22.95 KG/M2 | TEMPERATURE: 99 F

## 2017-10-24 DIAGNOSIS — F17.200 CURRENT SMOKER: ICD-10-CM

## 2017-10-24 DIAGNOSIS — Z01.818 PRE-OPERATIVE CLEARANCE: Primary | ICD-10-CM
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
## 2017-10-24 PROCEDURE — 99213 OFFICE O/P EST LOW 20 MIN: CPT | Mod: PBBFAC,PO | Performed by: PHYSICIAN ASSISTANT

## 2017-10-24 PROCEDURE — 99215 OFFICE O/P EST HI 40 MIN: CPT | Mod: S$PBB,,, | Performed by: PHYSICIAN ASSISTANT

## 2017-10-24 PROCEDURE — 99999 PR PBB SHADOW E&M-EST. PATIENT-LVL III: CPT | Mod: PBBFAC,,, | Performed by: PHYSICIAN ASSISTANT

## 2017-10-24 RX ORDER — ACETAMINOPHEN AND CODEINE PHOSPHATE 300; 30 MG/1; MG/1
1 TABLET ORAL
Status: ON HOLD | COMMUNITY
Start: 2017-10-22 | End: 2017-11-06

## 2017-10-24 RX ORDER — AZITHROMYCIN 250 MG/1
1 TABLET, FILM COATED ORAL 2 TIMES DAILY
Status: ON HOLD | COMMUNITY
Start: 2017-10-22 | End: 2017-11-06 | Stop reason: ALTCHOICE

## 2017-10-24 RX ORDER — ALBUTEROL SULFATE 90 UG/1
1-2 AEROSOL, METERED RESPIRATORY (INHALATION)
COMMUNITY
Start: 2017-10-23 | End: 2022-05-17

## 2017-10-24 RX ORDER — TRAMADOL HYDROCHLORIDE 50 MG/1
1 TABLET ORAL
COMMUNITY
Start: 2017-10-18

## 2017-10-24 NOTE — LETTER
October 24, 2017      Florencio Huerta Sr., MD  [address] [place] [address]           [facility]  [address]  [place] [address]  Phone: [phone]  Fax: [phone]          Patient: Susanne John   MR Number: [id number]   YOB: 1965   Date of Visit: 10/24/2017       Dear Dr. Florencio Huerta Sr.:    Thank you for referring Susanne John to me for evaluation. Attached you will find relevant portions of my assessment and plan of care.    If you have questions, please do not hesitate to call me. I look forward to following Susanne John along with you.    Sincerely,    MICK Craig  CC:  No Recipients    If you would like to receive this communication electronically, please contact [email or web address] or [phone] to request more information on [employer] access.    For providers and/or their staff who would like to refer a patient to [facility], please contact us through our one-stop-shop provider referral line, [facility], at [phone].    If you feel you have received this communication in error or would no longer like to receive these types of communications, please e-mail [email or web address]
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
## 2017-11-01 ENCOUNTER — CLINICAL SUPPORT (OUTPATIENT)
Dept: CARDIOLOGY | Facility: CLINIC | Age: 52
End: 2017-11-01
Payer: MEDICAID

## 2017-11-01 ENCOUNTER — APPOINTMENT (OUTPATIENT)
Dept: ORTHOPEDICS | Facility: CLINIC | Age: 52
End: 2017-11-01
Payer: MEDICAID

## 2017-11-01 DIAGNOSIS — Z01.818 PRE-OP TESTING: ICD-10-CM

## 2017-11-01 PROCEDURE — 93010 ELECTROCARDIOGRAM REPORT: CPT | Mod: S$PBB,,, | Performed by: INTERNAL MEDICINE

## 2017-11-01 PROCEDURE — 93005 ELECTROCARDIOGRAM TRACING: CPT | Mod: PBBFAC,PO | Performed by: INTERNAL MEDICINE

## 2017-11-03 ENCOUNTER — TELEPHONE (OUTPATIENT)
Dept: ORTHOPEDICS | Facility: CLINIC | Age: 52
End: 2017-11-03

## 2017-11-03 NOTE — TELEPHONE ENCOUNTER
Left message to have pt complete missed appts. Informed pt to call me back asap regarding this matter.

## 2017-11-03 NOTE — PRE ADMISSION SCREENING
Dr Huerta's nurse, Ramila informed that I have not been able to contact pt for pre op instructions.  States she is aware pt does not answer the phone

## 2017-11-05 ENCOUNTER — ANESTHESIA EVENT (OUTPATIENT)
Dept: SURGERY | Facility: HOSPITAL | Age: 52
End: 2017-11-05
Payer: MEDICAID

## 2017-11-06 ENCOUNTER — HOSPITAL ENCOUNTER (OUTPATIENT)
Facility: HOSPITAL | Age: 52
Discharge: HOME | End: 2017-11-06
Attending: ORTHOPAEDIC SURGERY | Admitting: ORTHOPAEDIC SURGERY
Payer: MEDICAID

## 2017-11-06 ENCOUNTER — ANESTHESIA (OUTPATIENT)
Dept: SURGERY | Facility: HOSPITAL | Age: 52
End: 2017-11-06
Payer: MEDICAID

## 2017-11-06 ENCOUNTER — SURGERY (OUTPATIENT)
Age: 52
End: 2017-11-06

## 2017-11-06 VITALS
OXYGEN SATURATION: 98 % | DIASTOLIC BLOOD PRESSURE: 88 MMHG | HEART RATE: 59 BPM | SYSTOLIC BLOOD PRESSURE: 137 MMHG | BODY MASS INDEX: 21.84 KG/M2 | WEIGHT: 123.25 LBS | TEMPERATURE: 98 F | RESPIRATION RATE: 23 BRPM | HEIGHT: 63 IN

## 2017-11-06 DIAGNOSIS — T14.8XXA OPEN FRACTURE: Primary | ICD-10-CM

## 2017-11-06 DIAGNOSIS — M79.642 LEFT HAND AND FOOT PAIN: ICD-10-CM

## 2017-11-06 DIAGNOSIS — M79.672 LEFT HAND AND FOOT PAIN: ICD-10-CM

## 2017-11-06 PROCEDURE — 63600175 PHARM REV CODE 636 W HCPCS: Performed by: ORTHOPAEDIC SURGERY

## 2017-11-06 PROCEDURE — 37000008 HC ANESTHESIA 1ST 15 MINUTES: Performed by: ORTHOPAEDIC SURGERY

## 2017-11-06 PROCEDURE — 36000705 HC OR TIME LEV I EA ADD 15 MIN: Performed by: ORTHOPAEDIC SURGERY

## 2017-11-06 PROCEDURE — 63600175 PHARM REV CODE 636 W HCPCS: Performed by: NURSE ANESTHETIST, CERTIFIED REGISTERED

## 2017-11-06 PROCEDURE — 25000003 PHARM REV CODE 250: Performed by: ANESTHESIOLOGY

## 2017-11-06 PROCEDURE — 37000009 HC ANESTHESIA EA ADD 15 MINS: Performed by: ORTHOPAEDIC SURGERY

## 2017-11-06 PROCEDURE — 71000015 HC POSTOP RECOV 1ST HR: Performed by: ORTHOPAEDIC SURGERY

## 2017-11-06 PROCEDURE — 88300 SURGICAL PATH GROSS: CPT | Mod: 26,,, | Performed by: PATHOLOGY

## 2017-11-06 PROCEDURE — 20680 REMOVAL OF IMPLANT DEEP: CPT | Mod: 58,,, | Performed by: ORTHOPAEDIC SURGERY

## 2017-11-06 PROCEDURE — 88300 SURGICAL PATH GROSS: CPT | Performed by: PATHOLOGY

## 2017-11-06 PROCEDURE — 71000033 HC RECOVERY, INTIAL HOUR: Performed by: ORTHOPAEDIC SURGERY

## 2017-11-06 PROCEDURE — 36000704 HC OR TIME LEV I 1ST 15 MIN: Performed by: ORTHOPAEDIC SURGERY

## 2017-11-06 RX ORDER — SODIUM CHLORIDE 0.9 % (FLUSH) 0.9 %
3 SYRINGE (ML) INJECTION EVERY 8 HOURS
Status: DISCONTINUED | OUTPATIENT
Start: 2017-11-06 | End: 2017-11-06 | Stop reason: HOSPADM

## 2017-11-06 RX ORDER — SODIUM CHLORIDE, SODIUM LACTATE, POTASSIUM CHLORIDE, CALCIUM CHLORIDE 600; 310; 30; 20 MG/100ML; MG/100ML; MG/100ML; MG/100ML
INJECTION, SOLUTION INTRAVENOUS CONTINUOUS
Status: DISCONTINUED | OUTPATIENT
Start: 2017-11-06 | End: 2017-11-06 | Stop reason: HOSPADM

## 2017-11-06 RX ORDER — ACETAMINOPHEN 10 MG/ML
1000 INJECTION, SOLUTION INTRAVENOUS ONCE
Status: DISCONTINUED | OUTPATIENT
Start: 2017-11-06 | End: 2017-11-06 | Stop reason: HOSPADM

## 2017-11-06 RX ORDER — PROPOFOL 10 MG/ML
VIAL (ML) INTRAVENOUS
Status: DISCONTINUED | OUTPATIENT
Start: 2017-11-06 | End: 2017-11-06

## 2017-11-06 RX ORDER — MEPERIDINE HYDROCHLORIDE 50 MG/ML
12.5 INJECTION INTRAMUSCULAR; INTRAVENOUS; SUBCUTANEOUS ONCE AS NEEDED
Status: DISCONTINUED | OUTPATIENT
Start: 2017-11-06 | End: 2017-11-06 | Stop reason: HOSPADM

## 2017-11-06 RX ORDER — HYDROCODONE BITARTRATE AND ACETAMINOPHEN 10; 325 MG/1; MG/1
1 TABLET ORAL EVERY 4 HOURS PRN
Qty: 60 TABLET | Refills: 0 | Status: SHIPPED | OUTPATIENT
Start: 2017-11-06 | End: 2017-11-16

## 2017-11-06 RX ORDER — LIDOCAINE HCL/PF 100 MG/5ML
SYRINGE (ML) INTRAVENOUS
Status: DISCONTINUED | OUTPATIENT
Start: 2017-11-06 | End: 2017-11-06

## 2017-11-06 RX ORDER — SODIUM CHLORIDE 0.9 % (FLUSH) 0.9 %
3 SYRINGE (ML) INJECTION
Status: DISCONTINUED | OUTPATIENT
Start: 2017-11-06 | End: 2017-11-06 | Stop reason: HOSPADM

## 2017-11-06 RX ORDER — CHLORHEXIDINE GLUCONATE ORAL RINSE 1.2 MG/ML
10 SOLUTION DENTAL
Status: DISCONTINUED | OUTPATIENT
Start: 2017-11-06 | End: 2017-11-06 | Stop reason: HOSPADM

## 2017-11-06 RX ORDER — CEFAZOLIN SODIUM 2 G/50ML
2 SOLUTION INTRAVENOUS
Status: DISCONTINUED | OUTPATIENT
Start: 2017-11-06 | End: 2017-11-06 | Stop reason: HOSPADM

## 2017-11-06 RX ORDER — LIDOCAINE HYDROCHLORIDE 10 MG/ML
1 INJECTION, SOLUTION EPIDURAL; INFILTRATION; INTRACAUDAL; PERINEURAL ONCE
Status: DISCONTINUED | OUTPATIENT
Start: 2017-11-06 | End: 2017-11-06 | Stop reason: HOSPADM

## 2017-11-06 RX ORDER — HYDROCODONE BITARTRATE AND ACETAMINOPHEN 5; 325 MG/1; MG/1
1 TABLET ORAL EVERY 4 HOURS PRN
Status: DISCONTINUED | OUTPATIENT
Start: 2017-11-06 | End: 2017-11-06 | Stop reason: HOSPADM

## 2017-11-06 RX ORDER — MIDAZOLAM HYDROCHLORIDE 1 MG/ML
INJECTION, SOLUTION INTRAMUSCULAR; INTRAVENOUS
Status: DISCONTINUED | OUTPATIENT
Start: 2017-11-06 | End: 2017-11-06

## 2017-11-06 RX ORDER — FENTANYL CITRATE 50 UG/ML
INJECTION, SOLUTION INTRAMUSCULAR; INTRAVENOUS
Status: DISCONTINUED | OUTPATIENT
Start: 2017-11-06 | End: 2017-11-06

## 2017-11-06 RX ORDER — HYDROMORPHONE HYDROCHLORIDE 1 MG/ML
0.2 INJECTION, SOLUTION INTRAMUSCULAR; INTRAVENOUS; SUBCUTANEOUS EVERY 5 MIN PRN
Status: DISCONTINUED | OUTPATIENT
Start: 2017-11-06 | End: 2017-11-06 | Stop reason: HOSPADM

## 2017-11-06 RX ADMIN — PROPOFOL 50 MG: 10 INJECTION, EMULSION INTRAVENOUS at 10:11

## 2017-11-06 RX ADMIN — LIDOCAINE HYDROCHLORIDE 20 MG: 20 INJECTION, SOLUTION INTRAVENOUS at 10:11

## 2017-11-06 RX ADMIN — MIDAZOLAM HYDROCHLORIDE 2 MG: 1 INJECTION, SOLUTION INTRAMUSCULAR; INTRAVENOUS at 10:11

## 2017-11-06 RX ADMIN — SODIUM CHLORIDE, SODIUM LACTATE, POTASSIUM CHLORIDE, AND CALCIUM CHLORIDE: 600; 310; 30; 20 INJECTION, SOLUTION INTRAVENOUS at 10:11

## 2017-11-06 RX ADMIN — FENTANYL CITRATE 100 MCG: 50 INJECTION, SOLUTION INTRAMUSCULAR; INTRAVENOUS at 10:11

## 2017-11-06 RX ADMIN — CEFAZOLIN SODIUM 2 G: 2 SOLUTION INTRAVENOUS at 10:11

## 2017-11-06 NOTE — PLAN OF CARE
Patient prepared for surgery. No family at bedside. Family friend to provide transportation home from surgery. Will need to call ride after surgery. Clothing bag locked in pre-op locker #5 per patient request.

## 2017-11-06 NOTE — ANESTHESIA PREPROCEDURE EVALUATION
11/06/2017  Susanne John is a 52 y.o., female.    Pre-op Assessment    I have reviewed the Patient Summary Reports.     I have reviewed the Nursing Notes.   I have reviewed the Medications.     Review of Systems  Anesthesia Hx:  No problems with previous Anesthesia  Denies Family Hx of Anesthesia complications.   Denies Personal Hx of Anesthesia complications.   Social:  Smoker    Cardiovascular:  Cardiovascular Normal     Pulmonary:  Pulmonary Normal    Neurological:  Neurology Normal        Physical Exam  General:  Well nourished    Airway/Jaw/Neck:  Airway Findings: Tongue: Normal General Airway Assessment: Adult  Mallampati: I      Dental:  Dental Findings:Missing a few teeth   Chest/Lungs:  Chest/Lungs Findings: Normal Respiratory Rate     Heart/Vascular:  Heart Findings: Normal            Anesthesia Plan  Type of Anesthesia, risks & benefits discussed:  Anesthesia Type:  general  Patient's Preference:   Intra-op Monitoring Plan:   Intra-op Monitoring Plan Comments:   Post Op Pain Control Plan:   Post Op Pain Control Plan Comments:   Induction:   IV  Beta Blocker:  Patient is not currently on a Beta-Blocker (No further documentation required).       Informed Consent: Patient understands risks and agrees with Anesthesia plan.  Questions answered.   ASA Score: 2     Day of Surgery Review of History & Physical: I have interviewed and examined the patient. I have reviewed the patient's H&P dated:  There are no significant changes.

## 2017-11-06 NOTE — OP NOTE
OPERATIVE DICTATION:    DATE OF SERVICE:11/06/2017      Preoperative Diagnosis:   left fifth finger hardware     Postoperative Diagnosis:   left fifth finger hardware     Procedure:  removal of left fifth finger hardware    Indication for surgery:  left fifth finger healed fracture with hardware    Anesthesia:  Mac anesthesia      Complications:   none     Surgeon: Florencio Huerta M.D.     Specimen: hardware removed from the left fifth finger    Assistant: KIKE Gold      Operative procedure:   patient brought to operating room after proper consent and placed under Mac anesthesia.  The left upper extremity was prepped with alcohol chlorhexidine and sterilely draped.  The timeout was performing the correct extremity identified.  The correct digit identified.   Pressure was placed over the pin sites.  The pins puncture the skin and the needle  used to remove the pins.  Under fluoroscopic evaluation the patient's fracture noted be healed and quite stable.  Betadine applied to puncture sites.  Band-Aids applied.  The tourniquet deflated and the patient left the operating room in good condition.                   Florencio Huerta M.D.

## 2017-11-06 NOTE — DISCHARGE INSTRUCTIONS
General Information:    1.  Do not drink alcoholic beverages including beer for 24 hours or as long as you are on pain medication..  2.  Do not drive a motor vehicle, operate machinery or power tools, or signs legal papers for 24 hours or as long as you are on pain medication.   3.  You may experience light-headedness, dizziness, and sleepiness following surgery. Please do not stay alone. A responsible adult should be with you for this 24 hour period.  4.  Go home and rest.    5. Progress slowly to a normal diet unless instructed.  Otherwise, begin with liquids such as soft drinks, then soup and crackers working up to solid foods. Drink plenty of nonalcoholic fluids.  6.  Certain anesthetics and pain medications produce nausea and vomiting in certain       individuals. If nausea becomes a problem at home, call you doctor.    7. Several times every hour while you are awake, take 2-3 deep breaths and cough. If you had stomach surgery hold a pillow or rolled towel firmly against your stomach before you cough. This will help with any pain the cough might cause.    8. Several times every hour while you are awake, pump and flex your feet 5-6 times and do foot circles. This will help prevent blood clots.    9.Call your doctor for severe pain, bleeding, fever, or signs or symptoms of infection (pain, swelling, redness, foul odor, drainage).    10.You can contact your doctor anytime by [phone] for the [facility] (at [facility]) or [phone] for the [facility] on [facility].   [email or web address] is another way to contact your doctor if you are an active participant online with [facility].

## 2017-11-06 NOTE — ANESTHESIA POSTPROCEDURE EVALUATION
"Anesthesia Post Evaluation    Patient: Susanne John    Procedure(s) Performed: Procedure(s) (LRB):  REMOVAL-HARDWARE-LEFT HAND (Left)    Final Anesthesia Type: general  Patient location during evaluation: PACU  Patient participation: Yes- Able to Participate  Level of consciousness: awake and alert  Post-procedure vital signs: reviewed and stable  Pain management: adequate  Airway patency: patent  PONV status at discharge: No PONV  Anesthetic complications: no      Cardiovascular status: blood pressure returned to baseline  Respiratory status: unassisted  Hydration status: euvolemic  Follow-up not needed.        Visit Vitals  /88   Pulse (!) 59   Temp 36.5 °C (97.7 °F)   Resp (!) 23   Ht 5' 3" (1.6 m)   Wt 55.9 kg (123 lb 3.8 oz)   SpO2 98%   Breastfeeding? No   BMI 21.83 kg/m²       Pain/Colleen Score: Pain Assessment Performed: Yes (11/6/2017 11:30 AM)  Presence of Pain: denies (11/6/2017 11:30 AM)  Colleen Score: 10 (11/6/2017 11:30 AM)      "

## 2017-11-06 NOTE — INTERVAL H&P NOTE
The patient has been examined and the H&P has been reviewed:    I concur with the findings and no changes have occurred since H&P was written.    Anesthesia/Surgery risks, benefits and alternative options discussed and understood by patient/family.          Active Hospital Problems    Diagnosis  POA    Left hand and foot pain [M79.642, M79.672]  Yes      Resolved Hospital Problems    Diagnosis Date Resolved POA   No resolved problems to display.

## 2017-11-06 NOTE — TRANSFER OF CARE
"Anesthesia Transfer of Care Note    Patient: Susanne John    Procedure(s) Performed: Procedure(s) (LRB):  REMOVAL-HARDWARE-LEFT HAND (Left)    Patient location: PACU    Anesthesia Type: MAC    Transport from OR: Transported from OR on room air with adequate spontaneous ventilation    Post pain: adequate analgesia    Post assessment: no apparent anesthetic complications and tolerated procedure well    Post vital signs: stable    Level of consciousness: awake, alert and oriented    Nausea/Vomiting: no nausea/vomiting    Complications: none    Transfer of care protocol was followed      Last vitals:   Visit Vitals  BP (!) 147/90 (BP Location: Right arm, Patient Position: Sitting)   Pulse 77   Temp 36.5 °C (97.7 °F) (Temporal)   Resp 18   Ht 5' 3" (1.6 m)   Wt 55.9 kg (123 lb 3.8 oz)   SpO2 99%   Breastfeeding? No   BMI 21.83 kg/m²     "

## 2017-11-06 NOTE — H&P
"SUBJECTIVE:  Patient is status post Left fifth finger pinning  .  Patient complains of   3/10 pain that is better with the  pain meds and aggravated with movement.  History reviewed. No pertinent past medical history.        Past Surgical History:   Procedure Laterality Date    HYSTERECTOMY        MANDIBLE FRACTURE SURGERY          Review of patient's allergies indicates:  No Known Allergies         Current Outpatient Prescriptions on File Prior to Visit   Medication Sig Dispense Refill    diclofenac (VOLTAREN) 50 MG EC tablet Take 1 tablet (50 mg total) by mouth 3 (three) times daily as needed. 14 tablet 0    doxycycline (VIBRA-TABS) 100 MG tablet Take 1 tablet (100 mg total) by mouth every 12 (twelve) hours. 40 tablet 0      No current facility-administered medications on file prior to visit.       /81   Pulse 82   Resp 18   Ht 5' 2" (1.575 m)   Wt 54.9 kg (121 lb)   BMI 22.13 kg/m²    ROS:  GENERAL: No fever, chills, fatigability or weight loss.  SKIN: No rashes, itching or changes in color or texture of skin.  HEAD: No headaches or recent head trauma.  EYES: Visual acuity fine. No photophobia, ocular pain or diplopia.  EARS: Denies ear pain, discharge or vertigo.  NOSE: No loss of smell, no epistaxis or postnasal drip.  MOUTH & THROAT: No hoarseness or change in voice. No excessive gum bleeding.  NODES: Denies swollen glands.  CHEST: Denies ORO, cyanosis, wheezing, cough and sputum production.  CARDIOVASCULAR: Denies chest pain, PND, orthopnea or reduced exercise tolerance.  ABDOMEN: Appetite fine. No weight loss. Denies diarrhea, abdominal pain, hematemesis or blood in stool.  URINARY: No flank pain, dysuria or hematuria.  PERIPHERAL VASCULAR: No claudication or cyanosis.  NEUROLOGIC: No history of seizures, paralysis, alteration of gait or coordination.  MUSCULOSKELETAL: See HPI     PE:  APPEARANCE: Well nourished, well developed, in no acute distress.   HEAD: Normocephalic, atraumatic.  EYES: " PERRL. EOMI.   EARS: TM's intact. Light reflex normal. No retraction or perforation.   NOSE: Mucosa pink. Airway clear.  MOUTH & THROAT: No tonsillar enlargement. No pharyngeal erythema or exudate. No stridor.  NECK: Supple.   NODES: No cervical, axillary or inguinal lymph node enlargement.  CHEST: Lungs clear to auscultation.  CARDIOVASCULAR: Normal S1, S2. No rubs, murmurs or gallops.  ABDOMEN: Bowel sounds normal. Not distended. Soft. No tenderness or masses.  NEUROLOGIC: Cranial Nerves: II-XII grossly intact, also see MUSCULOSKELETAL  MUSCULOSKELETAL:       Left hand Dressing intact, 2 plus radial  artery and ulnar artery pulses, light touch intact Left upper extremity.  All digits are warm. No erythema, no warmth, no drainage, No swelling, no significant tenderness. Hardware not visible through the skin.         ASSESSMENT:  The patient is stable and improving.        PLAN:  Hardware removal  Continue pain medication  Continue wound care  Continue occupational therapy

## 2017-11-06 NOTE — DISCHARGE SUMMARY
[facility]  Brief Operative Note     SUMMARY     Surgery Date: 11/6/2017     Surgeon(s) and Role:     * Florencio Huerta Sr., MD - Primary    Assisting Surgeon: None    Pre-op Diagnosis:  Painful orthopaedic hardware [T84.84XA]    Post-op Diagnosis:  Post-Op Diagnosis Codes:     * Painful orthopaedic hardware [T84.84XA]    Procedure(s) (LRB):  REMOVAL-HARDWARE-LEFT HAND (Left)    Anesthesia: General    Description of the findings of the procedure:  Hardware left fifth finger     Findings/Key Components:   Hardware left fifth finger     Estimated Blood Loss:1 cc  Specimens:   Specimen (12h ago through future)    Start     Ordered    11/06/17 1102  Specimen to Pathology - Surgery  Once     Comments:  1.) Explanted HardwareDX: Painful Hardware.      11/06/17 1101          Discharge Note    SUMMARY     Admit Date: 11/6/2017    Discharge Date and Time:  11/06/2017 11:46 AM    Hospital Course (synopsis of major diagnoses, care, treatment, and services provided during the course of the hospital stay):       Final Diagnosis: Post-Op Diagnosis Codes:     * Painful orthopaedic hardware [T84.84XA]    Disposition: Home or Self Care    Follow Up/Patient Instructions: Follow-up in 2 weeks, resume regular diet, elevate the left hand to 5 cm above the level of the heart to decrease swelling and pain.     Medications:[place].    Reconciled Home Medications:   Current Discharge Medication List      START taking these medications    Details   hydrocodone-acetaminophen 10-325mg (NORCO)  mg Tab Take 1 tablet by mouth every 4 (four) hours as needed.  Qty: 60 tablet, Refills: 0         CONTINUE these medications which have NOT CHANGED    Details   VENTOLIN HFA 90 mcg/actuation inhaler Inhale 1-2 puffs into the lungs as needed.      diclofenac (VOLTAREN) 50 MG EC tablet Take 1 tablet (50 mg total) by mouth 3 (three) times daily as needed.  Qty: 14 tablet, Refills: 0      traMADol (ULTRAM) 50 mg tablet Take 1 tablet by  mouth as needed.             Discharge Procedure Orders  Referral to Occupational therapy   Referral Priority: Routine Referral Type: Occupational Therapy   Referral Reason: Specialty Services Required    Requested Specialty: Occupational Therapy    Number of Visits Requested: 1      Diet general     Call MD for:  temperature >100.4     Call MD for:  persistent nausea and vomiting     Call MD for:  severe uncontrolled pain     Call MD for:  difficulty breathing, headache or visual disturbances     Call MD for:  redness, tenderness, or signs of infection (pain, swelling, redness, odor or green/yellow discharge around incision site)     Call MD for:  hives     Call MD for:  persistent dizziness or light-headedness     Call MD for:  extreme fatigue     Activity as tolerated     Remove dressing in 24 hours   Order Comments: Apply Betadine gauze and Band-Aids       Follow-up Information     Call Florencio Huerta Sr, MD.    Specialty:  Orthopedic Surgery  Why:  As needed, If symptoms worsen, For suture removal, For wound re-check  Contact information:  [address] [place] [address]  [phone]

## 2017-11-06 NOTE — PLAN OF CARE
Pt denies pain at present. Neurovascular checks remain intact. See flow sheet for detailed assessment. Will cont to monitor.

## 2017-11-13 ENCOUNTER — CLINICAL SUPPORT (OUTPATIENT)
Dept: REHABILITATION | Facility: HOSPITAL | Age: 52
End: 2017-11-13
Attending: ORTHOPAEDIC SURGERY
Payer: MEDICAID

## 2017-11-13 DIAGNOSIS — M79.642 LEFT HAND PAIN: Primary | ICD-10-CM

## 2017-11-13 PROCEDURE — 97110 THERAPEUTIC EXERCISES: CPT

## 2017-11-13 PROCEDURE — 97165 OT EVAL LOW COMPLEX 30 MIN: CPT

## 2017-11-13 PROCEDURE — 97022 WHIRLPOOL THERAPY: CPT

## 2017-11-13 NOTE — PROGRESS NOTES
Occupational Therapy -Hand / Wrist  Evaluation    Patient: Susanne John  MRN: [id number]  Date of Evaluation: 2017  Referring Physician:  Florencio Huerta Sr., MD   Diagnosis:   1. Left hand pain     2. 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 Pt reports no other medical conditions.   Medications: Susanne John has a current medication list which includes the following prescription(s): diclofenac, hydrocodone-acetaminophen 10-325mg, tramadol, and ventolin hfa.    Hand dominance: Right  DOS:2017; 17  S/P: 1 week; 7.5 weeks    Visit #1 of 1, expires 2017    Subjective:  Pt reports L RF was previously injured and does not bend normally but is not due to current incident.     Pain   At rest: 0 out of 10  With work/ Activity: 6 out of 10  Sleepin out of 10  Location of Pain : at MPJ and into proximal phalanx    Patients goals for therapy are:  bend my finger so I can go back to work and do regular stuff    Date of onset: 2017  History of Present Illness/Mechanism of Injury: Pt sustained a dog bite to her L hand resulting in a fracture of her SF proximal phalanx and laceration of her 5th extensor tendon. She went to the ER where an I and D was performed, Percutaneous pins were placed, and extensor tendon was repaired. Pins were removed on 2017.   Prior Therapy:  none    Objective:     Sensation: Pt denies tingling but states finger feels numb. Intact to light touch on dorsal, volar, radial, ulnar surfaces of Sf.   Incision  Incision well healed with no signs or symptoms of infection  observed.    Scar: Scar is thick and raised at radial RF prox phalanx and at dorsal-ulnar SF Prox phalanx with moderate adherence. Flat and mobile along ulnar hand to dorsal hand. Tender to palpation. Edema:        Date 11/13/17 11/13/17   (in cm) L R   RF P1 6.2 6.2   SF P1 6.1 5.45   MPs 20.0 20.9       Range of Motion:      AROM:   L SF  R SF  MP   0/87    0/85  PIP  5/44     9/100  DIP  0/25   0/55  GARNER  151  231    PROM limited for SF PIPJ flexion, hook position.                         Manual Muscle Test: Not tested                                       and Pinch Strength not tested at this time due to post operative status.    Prior Level of Function: Independent    Functional Limitations:   Self Care / ADL: Limited use of left for ADLs, grooming, hygiene, bathing; grasping or carrying objects requiring two hands, using L as an assist for opening containers  Work/Activities: Limited use of left hand for work activities, driving, carrying groceries, laundry, household chores, meal prep,   Leisure: Limited use of left hand for yardwork    Quick DASH administered. Pt exhitis 56% impairment.    Treatment Included: OT evaluation and instruction in written HEP including scar mob, retrograde massage, AROM hand, wrist  Modalities: Fluidotherapy x 15 min to lef thand  to decrease pain, desensitize, and increase tissue extensibility.   Manual: (6 min) Retrograde massage to left digits/hand/wrist x 3 min to stimulate lymphatics to decrease pain,  edema and increase AROM and functional use. Performed scar massage to left hand area for 3 minutes to decrease adhesions and improve tensile glide. STM to volar PIPJ.  There Ex: (15 min)    -Gentle PROM: isolated and composite joints SF   -AROM 10 ea: wrist all planes, tendon glides, wave-fist, hook-wave-hook, spreads, jt blocks, lifts, Isolated EDM 2 ways, and isolated FDS  Education: HEP     Patient demonstrates good understanding of HEP/modality use for pain management.       Assessment:  Pt is a 52 year old female who presents to occupational therapy 7 weeks s/p dogbite requiring I and D, EDC to SF repair, and ORIF of SF proximal phalanx. . Pt exhibits the following deficits:     Edema   Increased pain  Scar Adhesions  Soft tissue disfunction   Decreased Sensation  Decreased ROM   Joint stiffness  Decreased  and pinch strength   Decreased muscle strength   Decreased functional hand use of lef non-dominant UE    These impairments are limiting the patient's ability to perform ADLs, work, and leisure tasks without limitations. Pt will benefit from skilled OT intervention to address the above deficits and improve functional use of the left upper extremity.      Co-morbidities which may impact the plan of care and potentially impede the patient's progress in therapy include: limited RF AROM due to previous injury at PIPJ  Patients CLINICAL PRESENTATION is STABLE.     Short Term Goals: (4 weeks)  1. Pt will be independent with HEP in 2 visits.  2. Pt will report decreased pain to a 3-4/10 with ADLs.  3. Pt will increase SF GARNER by 30 degrees to enable dressing, grooming, grasping objects.  Long Term Goals: (by discharge)  1. Pt will report decreased pain to 1-2/10 with ADLs.   2. Pt will exhibit full, flat composite flexion of SF to facilitate secure functional weighted grasp of objects (ladarius due to RF limitation) during meal prep and work tasks.  3. Pt will exhibit L  strength of 60-75% of R comparative measures to allow a firm grasp on objects, steering wheel, etc.  4. Pt will exhibit 9# of functional pinch strength to allow writing,opening containers, and turning keys.  5. Pt will exhibit a significant increase (20-30 points) in the Quick DASH assessment.  6. Pt will return to PLOF, including RTW.     Plan:   Initiate Occupational Therapy 2  times per week for 8 weeks to decrease pain and edema, and increase A/PROM, strength, and functional use of left upper  extremity.    Treatment will include:    Modalities to decrease pain (moist heat, paraffin, fluidotherapy, US, iontophoresis), edema control, scar mobilization, manual therapy/joint mobilizations,A/PROM, therapeutic exercises/activities, strengthening, sensory re-education, HEP/education as well as any other treatments deemed necessary based on the patient's needs or progress.               I certify the need for these services furnished under this plan of treatment and while under my care    IRENE Jacobsen, ALPA   Occupational Therapist      ____________________________________                         __________________  Physician/Referring Practitioner                                               Date of Signature

## 2017-11-21 ENCOUNTER — TELEPHONE (OUTPATIENT)
Dept: ORTHOPEDICS | Facility: CLINIC | Age: 52
End: 2017-11-21

## 2017-11-21 NOTE — TELEPHONE ENCOUNTER
Left the patient a voicemail in regards to her appt today. Pt did not show up for 3:30 pm x-ray nor for 4:00 pm visit. Pt was left detailed message to call the office back asap, as it is imperative she is seen for hardware removal. -AS

## 2019-07-03 ENCOUNTER — HOSPITAL ENCOUNTER (EMERGENCY)
Facility: HOSPITAL | Age: 54
Discharge: HOME | End: 2019-07-03
Attending: EMERGENCY MEDICINE

## 2019-07-03 VITALS
RESPIRATION RATE: 16 BRPM | HEART RATE: 74 BPM | TEMPERATURE: 98 F | OXYGEN SATURATION: 94 % | SYSTOLIC BLOOD PRESSURE: 113 MMHG | DIASTOLIC BLOOD PRESSURE: 70 MMHG

## 2019-07-03 DIAGNOSIS — R07.81 RIB PAIN ON RIGHT SIDE: ICD-10-CM
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
## 2019-07-03 PROCEDURE — 99283 EMERGENCY DEPT VISIT LOW MDM: CPT | Mod: 25

## 2019-07-03 RX ORDER — NAPROXEN 375 MG/1
375 TABLET ORAL 2 TIMES DAILY WITH MEALS
Qty: 30 TABLET | Refills: 0 | Status: SHIPPED | OUTPATIENT
Start: 2019-07-03 | End: 2022-05-17

## 2019-07-03 NOTE — ED NOTES
"Patient identifiers verified and correct for Susanne John.    Patient presented to the ED with c/o right sided upper rib pain. Patient reports she fell last night and states "I think I broke my ribs". Patient reports pain with worst with movement, breathing, or coughing. Patient denies hitting her head or LOC     LOC: The patient is awake, alert and aware of environment with an appropriate affect, the patient is oriented x 3 and speaking appropriately.  APPEARANCE: Patient resting comfortably and in no acute distress, patient is clean and well groomed, patient's clothing is properly fastened.  HEENT: WNL   SKIN: The skin is warm and dry, color consistent with ethnicity, patient has normal skin turgor and moist mucus membranes, skin intact, no breakdown or bruising noted.  MUSCULOSKELETAL: Patient moving all extremities spontaneously. Patient reports right sided upper rib pain   RESPIRATORY: Airway is open and patent, respirations are spontaneous, and unlabored. Breath sounds are clear.   CARDIAC: Patient has a normal rate, no periphreal edema noted, capillary refill < 3 seconds.   ABDOMEN: Soft and non tender to palpation. No distention noted.   : Normal urinary patterns. Urine is yellow without foul odor.    "

## 2019-07-03 NOTE — ED PROVIDER NOTES
SCRIBE #1 NOTE: I, Corinne Mack, am scribing for, and in the presence of, Donnell Chua MD. I have scribed the entire note.      History      Chief Complaint   Patient presents with    Rib Injury     fell last night c/o R sided rib       Review of patient's allergies indicates:  No Known Allergies     HPI   HPI    7/3/2019, 8:59 AM   History obtained from the patient      History of Present Illness: Susanne John is a 54 y.o. female patient who presents to the Emergency Department for R rib pain which onset suddenly last night. Pt reports that she fell on her R side last night onto a chair and now she is have R rib pain. Symptoms are constant and moderate in severity. Palpation worsens the pt's pain. No mitigating factors reported. No associated sxs reported. Patient denies any fever, chills, head injury, CP, SOB, N/V, abd pain, back pain, neck pain, HA, dizziness, LOC, and all other sxs at this time. No prior Tx reported. No further complaints or concerns at this time.         Arrival mode: Personal vehicle    PCP: Primary Doctor No       Past Medical History:  History reviewed. No pertinent past medical history.    Past Surgical History:  Past Surgical History:   Procedure Laterality Date    FINGER FRACTURE SURGERY Left     5th finger with hardware placement    HYSTERECTOMY      INCISION AND DRAINAGE (I & D)-HAND Left 9/21/2017    Per[facility]    MANDIBLE FRACTURE SURGERY      REDUCTION WITH PINNING-CLOSED-FINGER Left 9/21/2017    Per[facility]    REMOVAL-HARDWARE Left 11/6/2017    Performed by [facility]    REPAIR- EXTENSOR TENDON Left 9/21/2017    Per[facility]    REPAIR-LACERATION HAND Left 9/21/2017    Performed by [facility] at [facility]         Family History:  Family History   Problem Relation Age of Onset    Lung cancer Mother         + Smoker    Cerebral  aneurysm Father        Social History:  Social History     Tobacco Use    Smoking status: Current Every Day Smoker     Packs/day: 1.00    Smokeless tobacco: Never Used   Substance and Sexual Activity    Alcohol use: Yes    Drug use: No    Sexual activity: Unknown       ROS   Review of Systems   Constitutional: Negative for chills and fever.   HENT: Negative for nosebleeds.         (-) head injury   Respiratory: Negative for cough and shortness of breath.    Cardiovascular: Negative for chest pain and leg swelling.   Gastrointestinal: Negative for abdominal pain, diarrhea, nausea and vomiting.   Musculoskeletal: Negative for back pain, neck pain and neck stiffness.        (+) fall  (+) R rib pain   Skin: Negative for rash and wound.   Neurological: Negative for dizziness, syncope (LOC), light-headedness, numbness and headaches.   All other systems reviewed and are negative.    Physical Exam      Initial Vitals [07/03/19 0917]   BP Pulse Resp Temp SpO2   113/70 74 16 98.4 °F (36.9 °C) (!) 94 %      MAP       --          Physical Exam  Nursing Notes and Vital Signs Reviewed.  Constitutional: Patient is in no acute distress. Awake and alert. Appropriate for age.   Head: Atraumatic. No facial instability or step-offs.   Eyes: PERRL. EOM normal. Conjunctivae normal.   HENT: Moist mucous membranes. No epistaxis. Patent airway.   Neck: No midline bony tenderness, deformities, or step-offs   Cardiovascular: Regular rate and rhythm. Heart sounds are normal. Intact distal pulses   Pulmonary/Chest: No respiratory distress. Breath sounds are normal. No decreased breath sounds. Chest wall is stable.   Abdominal: Soft and non-distended. Non-tender.   Back: No abrasions or ecchymosis. No midline bony tenderness to the T-spine or L-spine. No deformities or step-offs.   Musculoskeletal: Full range of motion in bilateral extremities. No obvious deformities. R rib TTP with no crepitus or step-offs.  Skin: Normal color. No  cyanosis. No lacerations. No abrasions   Neurological: Awake and alert. Appropriate for age. GCS 15. Normal speech. Motor strength is normal at 5/5 bilaterally. Non-focal neurological examination.      ED Course    Procedures  ED Vital Signs:  Vitals:    07/03/19 0917   BP: 113/70   Pulse: 74   Resp: 16   Temp: 98.4 °F (36.9 °C)   SpO2: (!) 94%       Abnormal Lab Results:  Labs Reviewed   HIV 1 / 2 ANTIBODY        All Lab Results:  None    Imaging Results:  Imaging Results          X-Ray Ribs 2 View Right (Final result)  Result time 07/03/19 09:18:38    Final result by Rene Matthews MD (07/03/19 09:18:38)                 Impression:      No evidence of displaced rib fracture.      Electronically signed by: Rene Matthews MD  Date:    07/03/2019  Time:    09:18             Narrative:    EXAMINATION:  XR RIBS 2 VIEW RIGHT    CLINICAL HISTORY:  Pleurodynia, right chest wall pain after trauma.  History of a fall.    TECHNIQUE:  Standard rib series.    COMPARISON:  None.    FINDINGS:  The rib views are negative.  No acute findings.                                        The Emergency Provider reviewed the vital signs and test results, which are outlined above.    ED Discussion     9:56 AM: Reassessed pt at this time. Pt is awake, alert, and in no distress. Discussed with pt all pertinent ED information and results. Discussed pt dx and plan of tx. Gave pt all f/u and return to the ED instructions. All questions and concerns were addressed at this time. Pt expresses understanding of information and instructions, and is comfortable with plan to discharge. Pt is stable for discharge.    I discussed with patient and/or family/caretaker that evaluation in the ED does not suggest any emergent or life threatening medical conditions requiring immediate intervention beyond what was provided in the ED, and I believe patient is safe for discharge.  Regardless, an unremarkable evaluation in the ED does not preclude the  development or presence of a serious of life threatening condition. As such, patient was instructed to return immediately for any worsening or change in current symptoms.    I discussed with patient and/or family/caretaker that negative X-ray does not rule out occult fracture or other soft tissue injury.  Persistent pain greater than 7-10 days or increased pain requires follow up, specifically with orthopedics.       ED Medication(s):  Medications - No data to display       Medication List      START taking these medications    naproxen 375 MG tablet  Commonly known as:  NAPROSYN  Take 1 tablet (375 mg total) by mouth 2 (two) times daily with meals.        ASK your doctor about these medications    diclofenac 50 MG EC tablet  Commonly known as:  VOLTAREN  Take 1 tablet (50 mg total) by mouth 3 (three) times daily as needed.     traMADol 50 mg tablet  Commonly known as:  ULTRAM     VENTOLIN HFA 90 mcg/actuation inhaler  Generic drug:  albuterol           Where to Get Your Medications      These medications were sent to [facility] [address] [place] [address]    Hours:  Mon-Fri, 8a-5:30p Phone:  [phone]   · naproxen 375 MG tablet         Follow-up Information     [facility] In 2 days.    Contact information:  [address] [facility]  [place] [address] [phone]                     Medical Decision Making    Medical Decision Making:   Clinical Tests:   Radiological Study: Ordered and Reviewed     Additional MDM:   Smoking Cessation: The patient is a smoker. The patient was counseled on smoking cessation for: 3 minutes. The patient was counseled on tobacco related  health complications.        Scribe Attestation:   Scribe #1: I performed the above scribed service and the documentation accurately describes the services I performed. I attest to the accuracy of the note 07/03/2019.    Attending:   Physician Attestation Statement for Scribe #1: Donnell ROBLES  MD Harshil, personally performed the services described in this documentation, as scribed by Corinne Mack, in my presence, and it is both accurate and complete.          Clinical Impression       ICD-10-CM ICD-9-CM   1. Fall, initial encounter W19.XXXA E888.9   2. Rib pain on right side R07.81 786.50       Disposition:   Disposition: Discharged  Condition: Stable           Donnell Chua MD  07/03/19 1044

## 2019-07-04 ENCOUNTER — HOSPITAL ENCOUNTER (EMERGENCY)
Facility: HOSPITAL | Age: 54
Discharge: HOME | End: 2019-07-04
Attending: EMERGENCY MEDICINE

## 2019-07-04 VITALS
TEMPERATURE: 98 F | HEART RATE: 77 BPM | SYSTOLIC BLOOD PRESSURE: 166 MMHG | OXYGEN SATURATION: 97 % | HEIGHT: 62 IN | WEIGHT: 118.94 LBS | RESPIRATION RATE: 18 BRPM | BODY MASS INDEX: 21.89 KG/M2 | DIASTOLIC BLOOD PRESSURE: 84 MMHG

## 2019-07-04 DIAGNOSIS — R07.81 RIB PAIN ON RIGHT SIDE: Primary | ICD-10-CM

## 2019-07-04 DIAGNOSIS — S20.211A CONTUSION OF RIGHT CHEST WALL, INITIAL ENCOUNTER: ICD-10-CM

## 2019-07-04 PROCEDURE — 99281 EMR DPT VST MAYX REQ PHY/QHP: CPT
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
## 2021-08-13 ENCOUNTER — TELEPHONE (OUTPATIENT)
Dept: OBSTETRICS AND GYNECOLOGY | Facility: CLINIC | Age: 56
End: 2021-08-13

## 2021-12-30 ENCOUNTER — HOSPITAL ENCOUNTER (EMERGENCY)
Facility: HOSPITAL | Age: 56
Discharge: HOME | End: 2021-12-31
Attending: EMERGENCY MEDICINE
Payer: MEDICAID

## 2021-12-30 DIAGNOSIS — U07.1 COVID-19: Primary | ICD-10-CM

## 2021-12-30 DIAGNOSIS — R55 SYNCOPE: ICD-10-CM

## 2021-12-30 LAB
ALBUMIN SERPL BCP-MCNC: 3.9 G/DL (ref 3.5–5.2)
ALP SERPL-CCNC: 38 U/L (ref 55–135)
ALT SERPL W/O P-5'-P-CCNC: 10 U/L (ref 10–44)
ANION GAP SERPL CALC-SCNC: 7 MMOL/L (ref 8–16)
AST SERPL-CCNC: 11 U/L (ref 10–40)
BILIRUB SERPL-MCNC: 0.6 MG/DL (ref 0.1–1)
BUN SERPL-MCNC: 16 MG/DL (ref 6–20)
CALCIUM SERPL-MCNC: 9.6 MG/DL (ref 8.7–10.5)
CHLORIDE SERPL-SCNC: 106 MMOL/L (ref 95–110)
CO2 SERPL-SCNC: 25 MMOL/L (ref 23–29)
CREAT SERPL-MCNC: 0.7 MG/DL (ref 0.5–1.4)
CTP QC/QA: YES
EST. GFR  (AFRICAN AMERICAN): >60 ML/MIN/1.73 M^2
EST. GFR  (NON AFRICAN AMERICAN): >60 ML/MIN/1.73 M^2
GLUCOSE SERPL-MCNC: 100 MG/DL (ref 70–110)
HEP C VIRUS HOLD SPECIMEN: NORMAL
POTASSIUM SERPL-SCNC: 4.2 MMOL/L (ref 3.5–5.1)
PROT SERPL-MCNC: 6.7 G/DL (ref 6–8.4)
SARS-COV-2 RDRP RESP QL NAA+PROBE: POSITIVE
SODIUM SERPL-SCNC: 138 MMOL/L (ref 136–145)

## 2021-12-30 PROCEDURE — 86803 HEPATITIS C AB TEST: CPT | Performed by: EMERGENCY MEDICINE

## 2021-12-30 PROCEDURE — U0002 COVID-19 LAB TEST NON-CDC: HCPCS | Performed by: EMERGENCY MEDICINE

## 2021-12-30 PROCEDURE — 93010 EKG 12-LEAD: ICD-10-PCS | Mod: ,,, | Performed by: INTERNAL MEDICINE

## 2021-12-30 PROCEDURE — 99285 EMERGENCY DEPT VISIT HI MDM: CPT | Mod: 25

## 2021-12-30 PROCEDURE — 25000003 PHARM REV CODE 250: Performed by: EMERGENCY MEDICINE

## 2021-12-30 PROCEDURE — 87389 HIV-1 AG W/HIV-1&-2 AB AG IA: CPT | Performed by: EMERGENCY MEDICINE

## 2021-12-30 PROCEDURE — 93010 ELECTROCARDIOGRAM REPORT: CPT | Mod: ,,, | Performed by: INTERNAL MEDICINE

## 2021-12-30 PROCEDURE — 85025 COMPLETE CBC W/AUTO DIFF WBC: CPT | Performed by: EMERGENCY MEDICINE

## 2021-12-30 PROCEDURE — 80053 COMPREHEN METABOLIC PANEL: CPT | Performed by: EMERGENCY MEDICINE

## 2021-12-30 PROCEDURE — 93005 ELECTROCARDIOGRAM TRACING: CPT

## 2021-12-30 PROCEDURE — 96360 HYDRATION IV INFUSION INIT: CPT

## 2021-12-30 RX ADMIN — SODIUM CHLORIDE 1000 ML: 0.9 INJECTION, SOLUTION INTRAVENOUS at 11:12

## 2021-12-30 NOTE — Clinical Note
"Susanne"Jed John was seen and treated in our emergency department on 12/30/2021.  She may return to work on 01/10/2022.       If you have any questions or concerns, please don't hesitate to call.      Harris Barr MD"

## 2021-12-31 VITALS
DIASTOLIC BLOOD PRESSURE: 67 MMHG | BODY MASS INDEX: 21.71 KG/M2 | TEMPERATURE: 101 F | HEART RATE: 61 BPM | OXYGEN SATURATION: 97 % | WEIGHT: 118 LBS | RESPIRATION RATE: 20 BRPM | SYSTOLIC BLOOD PRESSURE: 104 MMHG | HEIGHT: 62 IN

## 2021-12-31 LAB
BASOPHILS # BLD AUTO: 0.01 K/UL (ref 0–0.2)
BASOPHILS NFR BLD: 0.2 % (ref 0–1.9)
DIFFERENTIAL METHOD: ABNORMAL
EOSINOPHIL # BLD AUTO: 0 K/UL (ref 0–0.5)
EOSINOPHIL NFR BLD: 0 % (ref 0–8)
ERYTHROCYTE [DISTWIDTH] IN BLOOD BY AUTOMATED COUNT: 13.7 % (ref 11.5–14.5)
HCT VFR BLD AUTO: 37.5 % (ref 37–48.5)
HCV AB SERPL QL IA: NEGATIVE
HGB BLD-MCNC: 12.3 G/DL (ref 12–16)
HIV 1+2 AB+HIV1 P24 AG SERPL QL IA: NEGATIVE
IMM GRANULOCYTES # BLD AUTO: 0.01 K/UL (ref 0–0.04)
IMM GRANULOCYTES NFR BLD AUTO: 0.2 % (ref 0–0.5)
LYMPHOCYTES # BLD AUTO: 0.8 K/UL (ref 1–4.8)
LYMPHOCYTES NFR BLD: 14.9 % (ref 18–48)
MCH RBC QN AUTO: 30.9 PG (ref 27–31)
MCHC RBC AUTO-ENTMCNC: 32.8 G/DL (ref 32–36)
MCV RBC AUTO: 94 FL (ref 82–98)
MONOCYTES # BLD AUTO: 0.6 K/UL (ref 0.3–1)
MONOCYTES NFR BLD: 11 % (ref 4–15)
NEUTROPHILS # BLD AUTO: 3.8 K/UL (ref 1.8–7.7)
NEUTROPHILS NFR BLD: 73.7 % (ref 38–73)
NRBC BLD-RTO: 0 /100 WBC
PLATELET # BLD AUTO: 226 K/UL (ref 150–450)
PMV BLD AUTO: 10 FL (ref 9.2–12.9)
RBC # BLD AUTO: 3.98 M/UL (ref 4–5.4)
WBC # BLD AUTO: 5.1 K/UL (ref 3.9–12.7)

## 2021-12-31 PROCEDURE — 25000003 PHARM REV CODE 250: Performed by: EMERGENCY MEDICINE

## 2021-12-31 RX ORDER — SODIUM CHLORIDE 9 MG/ML
INJECTION, SOLUTION INTRAVENOUS ONCE
Status: COMPLETED | OUTPATIENT
Start: 2021-12-31 | End: 2021-12-31

## 2021-12-31 RX ADMIN — SODIUM CHLORIDE: 0.9 INJECTION, SOLUTION INTRAVENOUS at 01:12
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
## 2022-04-07 ENCOUNTER — OFFICE VISIT (OUTPATIENT)
Dept: OBSTETRICS AND GYNECOLOGY | Facility: CLINIC | Age: 57
End: 2022-04-07
Payer: MEDICAID

## 2022-04-07 VITALS
BODY MASS INDEX: 24.09 KG/M2 | WEIGHT: 130.94 LBS | SYSTOLIC BLOOD PRESSURE: 100 MMHG | DIASTOLIC BLOOD PRESSURE: 68 MMHG | HEIGHT: 62 IN

## 2022-04-07 DIAGNOSIS — Z01.419 WELL WOMAN EXAM WITH ROUTINE GYNECOLOGICAL EXAM: Primary | ICD-10-CM

## 2022-04-07 DIAGNOSIS — Z87.898 HISTORY OF PELVIC MASS: ICD-10-CM

## 2022-04-07 PROCEDURE — 1159F PR MEDICATION LIST DOCUMENTED IN MEDICAL RECORD: ICD-10-PCS | Mod: CPTII,,, | Performed by: OBSTETRICS & GYNECOLOGY

## 2022-04-07 PROCEDURE — 3008F BODY MASS INDEX DOCD: CPT | Mod: CPTII,,, | Performed by: OBSTETRICS & GYNECOLOGY

## 2022-04-07 PROCEDURE — 99213 OFFICE O/P EST LOW 20 MIN: CPT | Mod: PBBFAC | Performed by: OBSTETRICS & GYNECOLOGY

## 2022-04-07 PROCEDURE — 3078F DIAST BP <80 MM HG: CPT | Mod: CPTII,,, | Performed by: OBSTETRICS & GYNECOLOGY

## 2022-04-07 PROCEDURE — 99386 PREV VISIT NEW AGE 40-64: CPT | Mod: S$PBB,,, | Performed by: OBSTETRICS & GYNECOLOGY

## 2022-04-07 PROCEDURE — 1160F RVW MEDS BY RX/DR IN RCRD: CPT | Mod: CPTII,,, | Performed by: OBSTETRICS & GYNECOLOGY

## 2022-04-07 PROCEDURE — 1159F MED LIST DOCD IN RCRD: CPT | Mod: CPTII,,, | Performed by: OBSTETRICS & GYNECOLOGY

## 2022-04-07 PROCEDURE — 87624 HPV HI-RISK TYP POOLED RSLT: CPT | Performed by: OBSTETRICS & GYNECOLOGY

## 2022-04-07 PROCEDURE — 99999 PR PBB SHADOW E&M-EST. PATIENT-LVL III: CPT | Mod: PBBFAC,,, | Performed by: OBSTETRICS & GYNECOLOGY

## 2022-04-07 PROCEDURE — 3074F PR MOST RECENT SYSTOLIC BLOOD PRESSURE < 130 MM HG: ICD-10-PCS | Mod: CPTII,,, | Performed by: OBSTETRICS & GYNECOLOGY

## 2022-04-07 PROCEDURE — 88175 CYTOPATH C/V AUTO FLUID REDO: CPT | Performed by: OBSTETRICS & GYNECOLOGY

## 2022-04-07 PROCEDURE — 3074F SYST BP LT 130 MM HG: CPT | Mod: CPTII,,, | Performed by: OBSTETRICS & GYNECOLOGY

## 2022-04-07 PROCEDURE — 1160F PR REVIEW ALL MEDS BY PRESCRIBER/CLIN PHARMACIST DOCUMENTED: ICD-10-PCS | Mod: CPTII,,, | Performed by: OBSTETRICS & GYNECOLOGY

## 2022-04-07 PROCEDURE — 3078F PR MOST RECENT DIASTOLIC BLOOD PRESSURE < 80 MM HG: ICD-10-PCS | Mod: CPTII,,, | Performed by: OBSTETRICS & GYNECOLOGY

## 2022-04-07 PROCEDURE — 3008F PR BODY MASS INDEX (BMI) DOCUMENTED: ICD-10-PCS | Mod: CPTII,,, | Performed by: OBSTETRICS & GYNECOLOGY

## 2022-04-07 PROCEDURE — 99386 PR PREVENTIVE VISIT,NEW,40-64: ICD-10-PCS | Mod: S$PBB,,, | Performed by: OBSTETRICS & GYNECOLOGY

## 2022-04-07 PROCEDURE — 99999 PR PBB SHADOW E&M-EST. PATIENT-LVL III: ICD-10-PCS | Mod: PBBFAC,,, | Performed by: OBSTETRICS & GYNECOLOGY

## 2022-04-07 NOTE — PROGRESS NOTES
Subjective:       Patient ID: Susanne John is a 57 y.o. female.    Chief Complaint:  Well Woman      History of Present Illness  HPI  Annual Exam-Postmenopausal  Patient presents for annual exam. The patient has no complaints today. The patient is not sexually active. GYN screening history: last pap: patient does not recall when last pap was and last mammogram: patient does not recall when last mammogram was done. The patient is not taking hormone replacement therapy. Patient denies post-menopausal vaginal bleeding. The patient wears seatbelts: yes. The patient participates in regular exercise: no. Has the patient ever been transfused or tattooed?: yes. The patient reports that there is not domestic violence in her life.  Pt reports having emergency hysterectomy with ovarian removal in [place], [place] in 2021 due to a large ovarian mass.  Pt has a poor recollection of this (does not recall pathology results, MD names, or name of hospital where it was done).  However, pt does recall that she was advised to follow up with her Gyn.  Pt was there on vacation when it happened and returned to  without following up until today.  Pt would like to establish care with [facility].  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  All paps since have been normal per pt recollection.    GYN & OB History  No LMP recorded. Patient has had a hysterectomy.   Date of Last Pap: No result found    OB History    Para Term  AB Living   5 2   2 3 1   SAB IAB Ectopic Multiple Live Births           1      # Outcome Date GA Lbr Van/2nd Weight Sex Delivery Anes PTL Lv   5   36w0d          4   36w0d   F  EPI Y HALLIE      Complications: Leak of anastomosis between gastrointestinal structures   3 AB            2 AB            1 AB                Review of Systems  Review of Systems   Constitutional: Negative for activity change, appetite change, chills, fatigue, fever and unexpected weight change.    Respiratory: Negative for shortness of breath.    Cardiovascular: Negative for chest pain, palpitations and leg swelling.   Gastrointestinal: Negative for abdominal pain, bloating, blood in stool, constipation, diarrhea, nausea and vomiting.   Genitourinary: Negative for dysuria, flank pain, frequency, genital sores, hematuria, menstrual problem, pelvic pain, urgency, vaginal bleeding, vaginal discharge, vaginal pain, urinary incontinence, vaginal dryness and vaginal odor.   Musculoskeletal: Negative for back pain.   Integumentary:  Negative for breast mass, nipple discharge, breast skin changes and breast tenderness.   Neurological: Negative for syncope and headaches.   Breast: Negative for asymmetry, lump, mass, mastodynia, nipple discharge, skin changes and tenderness          Objective:    Physical Exam:   Constitutional: She is oriented to person, place, and time. She appears well-developed and well-nourished. No distress.    HENT:   Head: Normocephalic and atraumatic.    Eyes: Pupils are equal, round, and reactive to light. EOM are normal.     Cardiovascular: Normal rate, regular rhythm and normal heart sounds.     Pulmonary/Chest: Effort normal and breath sounds normal.        Abdominal: Soft. Bowel sounds are normal. She exhibits no distension. There is no abdominal tenderness.         Genitourinary:    Vagina, right adnexa and left adnexa normal.      Pelvic exam was performed with patient supine.   There is no rash, tenderness, lesion or injury on the right labia. There is no rash, tenderness, lesion or injury on the left labia. Right adnexum displays no mass, no tenderness and no fullness. Left adnexum displays no mass, no tenderness and no fullness. Vaginal cuff normal.  No erythema,  no vaginal discharge, tenderness or bleeding in the vagina.    No foreign body in the vagina.      No signs of injury in the vagina.   Cervix is absent.Uterus is absent.           Musculoskeletal: Normal range of motion  and moves all extremeties. No tenderness or edema.       Neurological: She is alert and oriented to person, place, and time.    Skin: Skin is warm and dry.    Psychiatric: She has a normal mood and affect. Her behavior is normal. Thought content normal.          Assessment:        1. Well woman exam with routine gynecological exam    2. History of pelvic mass               Plan:      Well woman exam with routine gynecological exam  -     Liquid-Based Pap Smear, Screening  -     HPV High Risk Genotypes, PCR  -     MMG  -     Pt was counseled on cervical/vaginal screening guidelines and recommendations.  If today's pap smear result is negative, next pap smear will be due in 3-5 yrs.   Will need to review surgery records to determine need for future screening.  -     Pt was advised on current breast cancer screening recommendations.  Pt desires to proceed with screening MMG.  -     Follow up with PCP for routine health maintenance needs.    History of pelvic mass  -     Pt with poor recollection of events regarding surgery for mass last year.   Exam today reveals no cervix, thus confirms history of total hysterectomy.  Exam was otherwise unremarkable and without evidence of pelvic mass.  Pt will need to review her records at home to determine name of facility and possibly MD/MDs that took care of her.  Pt will return with this information so that we may request records.  Pt advised on importance of getting this information and on risks of proceeding without this knowledge.        Follow up in about 1 year (around 4/7/2023).

## 2022-04-11 ENCOUNTER — PATIENT MESSAGE (OUTPATIENT)
Dept: RESEARCH | Facility: HOSPITAL | Age: 57
End: 2022-04-11
Payer: MEDICAID

## 2022-04-13 LAB
FINAL PATHOLOGIC DIAGNOSIS: NORMAL
HPV HR 12 DNA SPEC QL NAA+PROBE: NEGATIVE
HPV16 AG SPEC QL: NEGATIVE
HPV18 DNA SPEC QL NAA+PROBE: NEGATIVE
Lab: NORMAL

## 2022-05-17 ENCOUNTER — OFFICE VISIT (OUTPATIENT)
Dept: OBSTETRICS AND GYNECOLOGY | Facility: CLINIC | Age: 57
End: 2022-05-17
Payer: MEDICAID

## 2022-05-17 VITALS
DIASTOLIC BLOOD PRESSURE: 66 MMHG | HEIGHT: 62 IN | WEIGHT: 128.5 LBS | BODY MASS INDEX: 23.65 KG/M2 | SYSTOLIC BLOOD PRESSURE: 96 MMHG

## 2022-05-17 DIAGNOSIS — Z87.898 HISTORY OF PELVIC MASS: Primary | ICD-10-CM

## 2022-05-17 PROCEDURE — 99212 OFFICE O/P EST SF 10 MIN: CPT | Mod: S$PBB,,, | Performed by: OBSTETRICS & GYNECOLOGY

## 2022-05-17 PROCEDURE — 99212 PR OFFICE/OUTPT VISIT, EST, LEVL II, 10-19 MIN: ICD-10-PCS | Mod: S$PBB,,, | Performed by: OBSTETRICS & GYNECOLOGY

## 2022-05-17 PROCEDURE — 1159F MED LIST DOCD IN RCRD: CPT | Mod: CPTII,,, | Performed by: OBSTETRICS & GYNECOLOGY

## 2022-05-17 PROCEDURE — 1160F RVW MEDS BY RX/DR IN RCRD: CPT | Mod: CPTII,,, | Performed by: OBSTETRICS & GYNECOLOGY

## 2022-05-17 PROCEDURE — 1159F PR MEDICATION LIST DOCUMENTED IN MEDICAL RECORD: ICD-10-PCS | Mod: CPTII,,, | Performed by: OBSTETRICS & GYNECOLOGY

## 2022-05-17 PROCEDURE — 3008F PR BODY MASS INDEX (BMI) DOCUMENTED: ICD-10-PCS | Mod: CPTII,,, | Performed by: OBSTETRICS & GYNECOLOGY

## 2022-05-17 PROCEDURE — 3074F PR MOST RECENT SYSTOLIC BLOOD PRESSURE < 130 MM HG: ICD-10-PCS | Mod: CPTII,,, | Performed by: OBSTETRICS & GYNECOLOGY

## 2022-05-17 PROCEDURE — 1160F PR REVIEW ALL MEDS BY PRESCRIBER/CLIN PHARMACIST DOCUMENTED: ICD-10-PCS | Mod: CPTII,,, | Performed by: OBSTETRICS & GYNECOLOGY

## 2022-05-17 PROCEDURE — 3078F DIAST BP <80 MM HG: CPT | Mod: CPTII,,, | Performed by: OBSTETRICS & GYNECOLOGY

## 2022-05-17 PROCEDURE — 3078F PR MOST RECENT DIASTOLIC BLOOD PRESSURE < 80 MM HG: ICD-10-PCS | Mod: CPTII,,, | Performed by: OBSTETRICS & GYNECOLOGY

## 2022-05-17 PROCEDURE — 99999 PR PBB SHADOW E&M-EST. PATIENT-LVL III: CPT | Mod: PBBFAC,,, | Performed by: OBSTETRICS & GYNECOLOGY

## 2022-05-17 PROCEDURE — 99213 OFFICE O/P EST LOW 20 MIN: CPT | Mod: PBBFAC | Performed by: OBSTETRICS & GYNECOLOGY

## 2022-05-17 PROCEDURE — 3074F SYST BP LT 130 MM HG: CPT | Mod: CPTII,,, | Performed by: OBSTETRICS & GYNECOLOGY

## 2022-05-17 PROCEDURE — 3008F BODY MASS INDEX DOCD: CPT | Mod: CPTII,,, | Performed by: OBSTETRICS & GYNECOLOGY

## 2022-05-17 PROCEDURE — 99999 PR PBB SHADOW E&M-EST. PATIENT-LVL III: ICD-10-PCS | Mod: PBBFAC,,, | Performed by: OBSTETRICS & GYNECOLOGY

## 2022-05-17 RX ORDER — TRAZODONE HYDROCHLORIDE 50 MG/1
50 TABLET ORAL NIGHTLY
COMMUNITY
Start: 2022-05-13

## 2022-05-17 NOTE — PROGRESS NOTES
Subjective:       Patient ID: Susanne John is a 57 y.o. female.    Chief Complaint:  follow up to review records      History of Present Illness  HPI  Pt is here for follow up.  Pt reports that she found one page of a CT scan report (only has page 2/) and wanted to see if this was enough information regarding her surgery in [place] last year.  Pt reports no complaints today.  Pt now recalled that she was admitted to [facility] on 2021 and was transferred to [facility] in [place], where she had her surgery on 2021.    GYN & OB History  No LMP recorded. Patient has had a hysterectomy.   Date of Last Pap: 2022    OB History    Para Term  AB Living   5 2   2 3 1   SAB IAB Ectopic Multiple Live Births           1      # Outcome Date GA Lbr Van/2nd Weight Sex Delivery Anes PTL Lv   5   36w0d          4   36w0d   F  EPI Y HALLIE      Complications: Leak of anastomosis between gastrointestinal structures   3 AB            2 AB            1 AB                Review of Systems  Review of Systems   Constitutional: Negative for activity change, appetite change, chills, fatigue, fever and unexpected weight change.   Respiratory: Negative for shortness of breath.    Cardiovascular: Negative for chest pain, palpitations and leg swelling.   Gastrointestinal: Negative for abdominal pain, bloating, blood in stool, constipation, diarrhea, nausea and vomiting.   Genitourinary: Negative for flank pain, frequency, genital sores, hematuria, pelvic pain, urgency, vaginal bleeding, vaginal discharge, vaginal pain, urinary incontinence, vaginal dryness and vaginal odor.   Musculoskeletal: Negative for back pain.   Neurological: Negative for syncope and headaches.           Objective:    Physical Exam:   Constitutional: She is oriented to person, place, and time. She appears well-developed and well-nourished. No distress.                            Neurological: She is alert and oriented to person, place, and time.     Psychiatric: She has a normal mood and affect. Her behavior is normal. Thought content normal.          CT abd from [facility] 6/29/2021 (report incomplete; only has page 2/2)  Very large multiloculated cystic appearing mass throughout the abdomen and pelvis with mass effect upon adjacent structures.  The origin of the mass is not clear from imaging.       Assessment:        1. History of pelvic mass             Plan:      History of pelvic mass  -     Unfortunately, incomplete CT report is not sufficient to formulate follow up recommendations.  Will request records from [facility] and [facility] (Op report, pathology, imaging, labs, H+P, Progress Notes).    Follow up for Annual exam.

## 2022-06-15 ENCOUNTER — HOSPITAL ENCOUNTER (OUTPATIENT)
Dept: RADIOLOGY | Facility: HOSPITAL | Age: 57
Discharge: HOME | End: 2022-06-15
Attending: OBSTETRICS & GYNECOLOGY
Payer: MEDICAID

## 2022-06-15 VITALS — BODY MASS INDEX: 23.55 KG/M2 | WEIGHT: 128 LBS | HEIGHT: 62 IN

## 2022-06-15 DIAGNOSIS — Z01.419 WELL WOMAN EXAM WITH ROUTINE GYNECOLOGICAL EXAM: ICD-10-CM

## 2022-06-15 PROCEDURE — 77063 BREAST TOMOSYNTHESIS BI: CPT | Mod: 26,,, | Performed by: RADIOLOGY

## 2022-06-15 PROCEDURE — 77067 SCR MAMMO BI INCL CAD: CPT | Mod: 26,,, | Performed by: RADIOLOGY

## 2022-06-15 PROCEDURE — 77063 BREAST TOMOSYNTHESIS BI: CPT | Mod: TC

## 2022-06-15 PROCEDURE — 77067 MAMMO DIGITAL SCREENING BILAT WITH TOMO: ICD-10-PCS | Mod: 26,,, | Performed by: RADIOLOGY

## 2022-06-15 PROCEDURE — 77067 SCR MAMMO BI INCL CAD: CPT | Mod: TC

## 2022-06-15 PROCEDURE — 77063 MAMMO DIGITAL SCREENING BILAT WITH TOMO: ICD-10-PCS | Mod: 26,,, | Performed by: RADIOLOGY

## 2023-10-17 ENCOUNTER — HOSPITAL ENCOUNTER (EMERGENCY)
Facility: HOSPITAL | Age: 58
Discharge: HOME | End: 2023-10-17
Attending: EMERGENCY MEDICINE
Payer: MEDICAID

## 2023-10-17 VITALS
DIASTOLIC BLOOD PRESSURE: 62 MMHG | BODY MASS INDEX: 26.77 KG/M2 | RESPIRATION RATE: 16 BRPM | OXYGEN SATURATION: 98 % | SYSTOLIC BLOOD PRESSURE: 97 MMHG | TEMPERATURE: 99 F | WEIGHT: 146.38 LBS | HEART RATE: 100 BPM

## 2023-10-17 DIAGNOSIS — S60.222A CONTUSION OF LEFT HAND, INITIAL ENCOUNTER: Primary | ICD-10-CM

## 2023-10-17 PROCEDURE — 99282 EMERGENCY DEPT VISIT SF MDM: CPT

## 2023-10-17 NOTE — ED PROVIDER NOTES
Encounter Date: 10/17/2023       History     Chief Complaint   Patient presents with    Hand Problem     Pt c/o purple discoloration to left hand, states she was at work and entire left hand became purple. Noted discoloration to palm, hand is warm with good distal pulses and cap refill.      58-year-old female with complaint of purple color to bottom of left ring finger and middle finger while working today lifting boxes.  Patient denies fall.  Patient denies trauma.  Patient denies any other complaints.        Review of patient's allergies indicates:  No Known Allergies  Past Medical History:   Diagnosis Date    Abnormal Pap smear of cervix      Past Surgical History:   Procedure Laterality Date    FINGER FRACTURE SURGERY Left     5th finger with hardware placement    HYSTERECTOMY      MANDIBLE FRACTURE SURGERY       Family History   Problem Relation Age of Onset    Lung cancer Mother         + Smoker    Cerebral aneurysm Father     Cancer Maternal Grandmother      Social History     Tobacco Use    Smoking status: Former     Current packs/day: 1.00     Types: Cigarettes    Smokeless tobacco: Never   Substance Use Topics    Alcohol use: Not Currently    Drug use: No     Review of Systems   Constitutional:  Negative for fever.   HENT:  Negative for sore throat.    Respiratory:  Negative for shortness of breath.    Cardiovascular:  Negative for chest pain.   Gastrointestinal:  Negative for nausea.   Genitourinary:  Negative for dysuria.   Musculoskeletal:  Negative for back pain.   Skin:  Negative for rash.   Neurological:  Negative for weakness.   Hematological:  Does not bruise/bleed easily.       Physical Exam     Initial Vitals [10/17/23 1612]   BP Pulse Resp Temp SpO2   97/62 100 16 98.6 °F (37 °C) 98 %      MAP       --         Physical Exam    Nursing note and vitals reviewed.  Constitutional: She appears well-developed and well-nourished.   HENT:   Head: Normocephalic and atraumatic.   Eyes: Conjunctivae and  EOM are normal. Pupils are equal, round, and reactive to light.   Neck: Neck supple.   Normal range of motion.  Cardiovascular:  Normal rate, regular rhythm, normal heart sounds and intact distal pulses.           Pulmonary/Chest: Breath sounds normal.   Abdominal: Abdomen is soft. There is no abdominal tenderness. There is no rebound and no guarding.   Musculoskeletal:         General: Normal range of motion.      Cervical back: Normal range of motion and neck supple.      Comments: Mild bruising noted volar aspect of left ring finger, no swelling, full ROM left hand without difficulty, cap refill brisk, fingers pink and warm, 2+ left radial pulse     Neurological: She is alert and oriented to person, place, and time. She has normal strength and normal reflexes.   Skin: Skin is warm and dry.   Psychiatric: She has a normal mood and affect. Her behavior is normal. Thought content normal.         ED Course   Procedures  Labs Reviewed - No data to display       Imaging Results    None          Medications - No data to display  Medical Decision Making                             Clinical Impression:   Final diagnoses:  [S60.222A] Contusion of left hand, initial encounter (Primary)        ED Disposition Condition    Discharge Stable          ED Prescriptions    None       Follow-up Information       Follow up With Specialties Details Why Contact Info    PCP  Schedule an appointment as soon as possible for a visit  As needed              Kai Burgos NP  10/17/23 2683

## 2025-01-06 ENCOUNTER — HOSPITAL ENCOUNTER (EMERGENCY)
Facility: HOSPITAL | Age: 60
Discharge: HOME | End: 2025-01-06
Attending: EMERGENCY MEDICINE

## 2025-01-06 VITALS
HEIGHT: 62 IN | BODY MASS INDEX: 27.34 KG/M2 | TEMPERATURE: 98 F | OXYGEN SATURATION: 96 % | HEART RATE: 68 BPM | DIASTOLIC BLOOD PRESSURE: 60 MMHG | WEIGHT: 148.56 LBS | SYSTOLIC BLOOD PRESSURE: 101 MMHG | RESPIRATION RATE: 17 BRPM

## 2025-01-06 DIAGNOSIS — R53.1 WEAKNESS: ICD-10-CM

## 2025-01-06 DIAGNOSIS — R42 VERTIGO: Primary | ICD-10-CM

## 2025-01-06 DIAGNOSIS — R07.9 CHEST PAIN: ICD-10-CM

## 2025-01-06 LAB
ALBUMIN SERPL BCP-MCNC: 3.9 G/DL (ref 3.5–5.2)
ALP SERPL-CCNC: 46 U/L (ref 40–150)
ALT SERPL W/O P-5'-P-CCNC: 11 U/L (ref 10–44)
ANION GAP SERPL CALC-SCNC: 11 MMOL/L (ref 8–16)
AST SERPL-CCNC: 12 U/L (ref 10–40)
BACTERIA #/AREA URNS HPF: ABNORMAL /HPF
BASOPHILS # BLD AUTO: 0.02 K/UL (ref 0–0.2)
BASOPHILS NFR BLD: 0.3 % (ref 0–1.9)
BILIRUB SERPL-MCNC: 0.7 MG/DL (ref 0.1–1)
BILIRUB UR QL STRIP: NEGATIVE
BNP SERPL-MCNC: 19 PG/ML (ref 0–99)
BUN SERPL-MCNC: 17 MG/DL (ref 6–20)
CALCIUM SERPL-MCNC: 9.9 MG/DL (ref 8.7–10.5)
CHLORIDE SERPL-SCNC: 107 MMOL/L (ref 95–110)
CLARITY UR: ABNORMAL
CO2 SERPL-SCNC: 23 MMOL/L (ref 23–29)
COLOR UR: YELLOW
CREAT SERPL-MCNC: 0.7 MG/DL (ref 0.5–1.4)
DIFFERENTIAL METHOD BLD: ABNORMAL
EOSINOPHIL # BLD AUTO: 0 K/UL (ref 0–0.5)
EOSINOPHIL NFR BLD: 0.3 % (ref 0–8)
ERYTHROCYTE [DISTWIDTH] IN BLOOD BY AUTOMATED COUNT: 13.7 % (ref 11.5–14.5)
EST. GFR  (NO RACE VARIABLE): >60 ML/MIN/1.73 M^2
GLUCOSE SERPL-MCNC: 100 MG/DL (ref 70–110)
GLUCOSE UR QL STRIP: NEGATIVE
HCT VFR BLD AUTO: 41.7 % (ref 37–48.5)
HCV AB SERPL QL IA: NEGATIVE
HEP C VIRUS HOLD SPECIMEN: NORMAL
HGB BLD-MCNC: 13.2 G/DL (ref 12–16)
HGB UR QL STRIP: ABNORMAL
HIV 1+2 AB+HIV1 P24 AG SERPL QL IA: NEGATIVE
IMM GRANULOCYTES # BLD AUTO: 0.06 K/UL (ref 0–0.04)
IMM GRANULOCYTES NFR BLD AUTO: 0.8 % (ref 0–0.5)
INFLUENZA A, MOLECULAR: NEGATIVE
INFLUENZA B, MOLECULAR: NEGATIVE
KETONES UR QL STRIP: ABNORMAL
LEUKOCYTE ESTERASE UR QL STRIP: ABNORMAL
LYMPHOCYTES # BLD AUTO: 1.1 K/UL (ref 1–4.8)
LYMPHOCYTES NFR BLD: 13.8 % (ref 18–48)
MCH RBC QN AUTO: 29.8 PG (ref 27–31)
MCHC RBC AUTO-ENTMCNC: 31.7 G/DL (ref 32–36)
MCV RBC AUTO: 94 FL (ref 82–98)
MICROSCOPIC COMMENT: ABNORMAL
MONOCYTES # BLD AUTO: 0.3 K/UL (ref 0.3–1)
MONOCYTES NFR BLD: 4.4 % (ref 4–15)
NEUTROPHILS # BLD AUTO: 6.2 K/UL (ref 1.8–7.7)
NEUTROPHILS NFR BLD: 80.4 % (ref 38–73)
NITRITE UR QL STRIP: NEGATIVE
NRBC BLD-RTO: 0 /100 WBC
OHS QRS DURATION: 78 MS
OHS QTC CALCULATION: 424 MS
PH UR STRIP: 6 [PH] (ref 5–8)
PLATELET # BLD AUTO: 409 K/UL (ref 150–450)
PMV BLD AUTO: 9.7 FL (ref 9.2–12.9)
POCT GLUCOSE: 89 MG/DL (ref 70–110)
POTASSIUM SERPL-SCNC: 4 MMOL/L (ref 3.5–5.1)
PROT SERPL-MCNC: 7 G/DL (ref 6–8.4)
PROT UR QL STRIP: NEGATIVE
RBC # BLD AUTO: 4.43 M/UL (ref 4–5.4)
RBC #/AREA URNS HPF: 79 /HPF (ref 0–4)
SARS-COV-2 RDRP RESP QL NAA+PROBE: NEGATIVE
SODIUM SERPL-SCNC: 141 MMOL/L (ref 136–145)
SP GR UR STRIP: 1.01 (ref 1–1.03)
SPECIMEN SOURCE: NORMAL
SQUAMOUS #/AREA URNS HPF: 14 /HPF
TROPONIN I SERPL DL<=0.01 NG/ML-MCNC: <0.006 NG/ML (ref 0–0.03)
TROPONIN I SERPL DL<=0.01 NG/ML-MCNC: <0.006 NG/ML (ref 0–0.03)
URN SPEC COLLECT METH UR: ABNORMAL
UROBILINOGEN UR STRIP-ACNC: NEGATIVE EU/DL
WBC # BLD AUTO: 7.75 K/UL (ref 3.9–12.7)
WBC #/AREA URNS HPF: >100 /HPF (ref 0–5)
WBC CLUMPS URNS QL MICRO: ABNORMAL

## 2025-01-06 PROCEDURE — 99285 EMERGENCY DEPT VISIT HI MDM: CPT | Mod: 25

## 2025-01-06 PROCEDURE — 80053 COMPREHEN METABOLIC PANEL: CPT | Performed by: EMERGENCY MEDICINE

## 2025-01-06 PROCEDURE — 87389 HIV-1 AG W/HIV-1&-2 AB AG IA: CPT | Performed by: EMERGENCY MEDICINE

## 2025-01-06 PROCEDURE — 96374 THER/PROPH/DIAG INJ IV PUSH: CPT

## 2025-01-06 PROCEDURE — 86803 HEPATITIS C AB TEST: CPT | Performed by: EMERGENCY MEDICINE

## 2025-01-06 PROCEDURE — 96361 HYDRATE IV INFUSION ADD-ON: CPT

## 2025-01-06 PROCEDURE — 93010 ELECTROCARDIOGRAM REPORT: CPT | Mod: ,,, | Performed by: INTERNAL MEDICINE

## 2025-01-06 PROCEDURE — 87086 URINE CULTURE/COLONY COUNT: CPT | Performed by: EMERGENCY MEDICINE

## 2025-01-06 PROCEDURE — 87635 SARS-COV-2 COVID-19 AMP PRB: CPT | Performed by: EMERGENCY MEDICINE

## 2025-01-06 PROCEDURE — 63600175 PHARM REV CODE 636 W HCPCS: Performed by: EMERGENCY MEDICINE

## 2025-01-06 PROCEDURE — 84484 ASSAY OF TROPONIN QUANT: CPT | Performed by: EMERGENCY MEDICINE

## 2025-01-06 PROCEDURE — 93005 ELECTROCARDIOGRAM TRACING: CPT

## 2025-01-06 PROCEDURE — 25000003 PHARM REV CODE 250: Performed by: EMERGENCY MEDICINE

## 2025-01-06 PROCEDURE — 96375 TX/PRO/DX INJ NEW DRUG ADDON: CPT

## 2025-01-06 PROCEDURE — 82962 GLUCOSE BLOOD TEST: CPT

## 2025-01-06 PROCEDURE — 85025 COMPLETE CBC W/AUTO DIFF WBC: CPT | Performed by: EMERGENCY MEDICINE

## 2025-01-06 PROCEDURE — 87502 INFLUENZA DNA AMP PROBE: CPT | Performed by: EMERGENCY MEDICINE

## 2025-01-06 PROCEDURE — 81000 URINALYSIS NONAUTO W/SCOPE: CPT | Performed by: EMERGENCY MEDICINE

## 2025-01-06 PROCEDURE — 83880 ASSAY OF NATRIURETIC PEPTIDE: CPT | Performed by: EMERGENCY MEDICINE

## 2025-01-06 RX ORDER — ONDANSETRON HYDROCHLORIDE 2 MG/ML
4 INJECTION, SOLUTION INTRAVENOUS
Status: COMPLETED | OUTPATIENT
Start: 2025-01-06 | End: 2025-01-06

## 2025-01-06 RX ORDER — MECLIZINE HYDROCHLORIDE 25 MG/1
25 TABLET ORAL
Status: COMPLETED | OUTPATIENT
Start: 2025-01-06 | End: 2025-01-06

## 2025-01-06 RX ORDER — CEFTRIAXONE 1 G/1
1 INJECTION, POWDER, FOR SOLUTION INTRAMUSCULAR; INTRAVENOUS
Status: COMPLETED | OUTPATIENT
Start: 2025-01-06 | End: 2025-01-06

## 2025-01-06 RX ORDER — DIAZEPAM 5 MG/1
5 TABLET ORAL
Status: COMPLETED | OUTPATIENT
Start: 2025-01-06 | End: 2025-01-06

## 2025-01-06 RX ORDER — LORAZEPAM 2 MG/ML
1 INJECTION INTRAMUSCULAR
Status: COMPLETED | OUTPATIENT
Start: 2025-01-06 | End: 2025-01-06

## 2025-01-06 RX ORDER — MECLIZINE HYDROCHLORIDE 25 MG/1
25 TABLET ORAL 3 TIMES DAILY PRN
Qty: 20 TABLET | Refills: 0 | Status: SHIPPED | OUTPATIENT
Start: 2025-01-06

## 2025-01-06 RX ADMIN — ONDANSETRON 4 MG: 2 INJECTION INTRAMUSCULAR; INTRAVENOUS at 11:01

## 2025-01-06 RX ADMIN — SODIUM CHLORIDE 1000 ML: 9 INJECTION, SOLUTION INTRAVENOUS at 06:01

## 2025-01-06 RX ADMIN — LORAZEPAM 1 MG: 2 INJECTION INTRAMUSCULAR; INTRAVENOUS at 05:01

## 2025-01-06 RX ADMIN — DIAZEPAM 5 MG: 5 TABLET ORAL at 01:01

## 2025-01-06 RX ADMIN — MECLIZINE HYDROCHLORIDE 25 MG: 25 TABLET ORAL at 11:01

## 2025-01-06 RX ADMIN — SODIUM CHLORIDE 1000 ML: 9 INJECTION, SOLUTION INTRAVENOUS at 12:01

## 2025-01-06 RX ADMIN — CEFTRIAXONE 1 G: 1 INJECTION, POWDER, FOR SOLUTION INTRAMUSCULAR; INTRAVENOUS at 05:01

## 2025-01-06 NOTE — PHARMACY MED REC
"Admission Medication History     The home medication history was taken by Harry Hollis.    You may go to "Admission" then "Reconcile Home Medications" tabs to review and/or act upon these items.     The home medication list has been updated by the Pharmacy department.   Please read ALL comments highlighted in yellow.   Please address this information as you see fit.    Feel free to contact us if you have any questions or require assistance.      Medications listed below were obtained from: Patient/family and Analytic software- [employer]  (Not in a hospital admission)    Harry Hollis  [id number]    Current Outpatient Medications on File Prior to Encounter   Medication Sig Dispense Refill Last Dose/Taking   NO CURRENT OUTPATIENT MEDICATIONS                        .          "

## 2025-01-06 NOTE — Clinical Note
"Susanne"Jed John was seen and treated in our emergency department on 1/6/2025.  She may return to work on 01/10/2025.       If you have any questions or concerns, please don't hesitate to call.      Kassie Blood MD"

## 2025-01-06 NOTE — ED PROVIDER NOTES
SCRIBE #1 NOTE: I, Angella John, am scribing for, and in the presence of, Kassie Blood MD. I have scribed the entire note.       History     Chief Complaint   Patient presents with    Dizziness     Nausea and dizziness started yesterday. Denies any pain. States she is weak all over. Denies any medical hx.     Review of patient's allergies indicates:  No Known Allergies      History of Present Illness     HPI    1/6/2025, 11:20 AM  History obtained from the patient      History of Present Illness: Susanne John is a 59 y.o. female patient with a PMHx of abnormal pap smear of cervix who presents to the Emergency Department for evaluation of dizziness which onset gradually since yesterday. Pt states she feels herself spinning and gets doubled vision when dizzy. Dizziness worsened with standing and sitting up and improved with laying down and eyes closed. Pt reports this has not occurred before. Symptoms are constant and moderate in severity. Associated sxs include blurry vision and N/V. Patient denies any numbness, SOB, CP, ear pain, and all other sxs at this time. No prior Tx. No further complaints or concerns at this time.       Arrival mode: Personal vehicle    PCP: Elsa, Primary Doctor        Past Medical History:  Past Medical History:   Diagnosis Date    Abnormal Pap smear of cervix        Past Surgical History:  Past Surgical History:   Procedure Laterality Date    FINGER FRACTURE SURGERY Left     5th finger with hardware placement    HYSTERECTOMY      MANDIBLE FRACTURE SURGERY           Family History:  Family History   Problem Relation Name Age of Onset    Lung cancer Mother          + Smoker    Cerebral aneurysm Father      Cancer Maternal Grandmother         Social History:  Social History     Tobacco Use    Smoking status: Former     Current packs/day: 1.00     Types: Cigarettes    Smokeless tobacco: Never   Substance and Sexual Activity    Alcohol use: Not Currently    Drug use: No    Sexual  activity: Not Currently     Partners: Male        Review of Systems     Review of Systems   Constitutional:  Negative for chills and fever.   HENT:  Negative for congestion, ear pain and sore throat.    Eyes:         (+) blurry vision   Respiratory:  Negative for cough and shortness of breath.    Cardiovascular:  Negative for chest pain.   Gastrointestinal:  Positive for nausea and vomiting. Negative for abdominal pain.   Genitourinary:  Negative for dysuria.   Musculoskeletal:  Negative for back pain.   Skin:  Negative for rash.   Neurological:  Positive for dizziness. Negative for weakness and numbness.   Hematological:  Does not bruise/bleed easily.   All other systems reviewed and are negative.     Physical Exam     Initial Vitals [01/06/25 1021]   BP Pulse Resp Temp SpO2   93/63 69 16 97.8 °F (36.6 °C) 98 %      MAP       --          Physical Exam  Nursing Notes and Vital Signs Reviewed.  Constitutional: Patient is in no acute distress. Well-developed and well-nourished.  Head: Atraumatic. Normocephalic.  Eyes: PERRL. EOM intact. Conjunctivae are not pale. No scleral icterus. No nystagmus.  ENT: Mucous membranes are moist. Oropharynx is clear and symmetric.    Neck: Supple. Full ROM. No lymphadenopathy. No carotid bruit.  Cardiovascular: Regular rate. Regular rhythm. No murmurs, rubs, or gallops. Distal pulses are 2+ and symmetric.   Pulmonary/Chest: No respiratory distress. Clear to auscultation bilaterally. No wheezing or rales.  Abdominal: Soft and non-distended.  There is no tenderness.  No rebound, guarding, or rigidity. Good bowel sounds.  Genitourinary: No CVA tenderness  Musculoskeletal: Moves all extremities. No obvious deformities. No edema. No calf tenderness.  Skin: Warm and dry.  Neurological:  Alert, awake, and appropriate.  Normal speech.  No acute focal neurological deficits are appreciated.  Psychiatric: Normal affect. Good eye contact. Appropriate in content.     ED Course   Procedures  ED  "Vital Signs:  Vitals:    01/06/25 1021 01/06/25 1130 01/06/25 1200 01/06/25 1230   BP: 93/63 (!) 104/58 (!) 99/59 (!) 99/59   Pulse: 69 68 66 69   Resp: 16 18 (!) 26 (!) 21   Temp: 97.8 °F (36.6 °C)      TempSrc: Oral      SpO2: 98% 100% 100% 100%   Weight: 67.4 kg (148 lb 9.4 oz)      Height: 5' 2" (1.575 m)       01/06/25 1300 01/06/25 1430 01/06/25 1500 01/06/25 1530   BP: 102/64 (!) 99/58 (!) 96/54 (!) 94/59   Pulse: 65 61 (!) 59 60   Resp: 19 16 16 18   Temp:       TempSrc:       SpO2: 100% 100% 98% 98%   Weight:       Height:        01/06/25 1600 01/06/25 1630 01/06/25 1700 01/06/25 1730   BP: (!) 100/58 (!) 95/57 (!) 103/57 (!) 99/57   Pulse: 70 66 66 72   Resp: 20 19 (!) 22 16   Temp:       TempSrc:       SpO2: 100% 98% 99% 99%   Weight:       Height:        01/06/25 1836 01/06/25 1900 01/06/25 1930   BP: (!) 108/59 97/60 (!) 94/56   Pulse: 70 69 61   Resp: 18 17 18   Temp:      TempSrc:      SpO2: 98% 96% 96%   Weight:      Height:          Abnormal Lab Results:  Labs Reviewed   CBC W/ AUTO DIFFERENTIAL - Abnormal       Result Value    WBC 7.75      RBC 4.43      Hemoglobin 13.2      Hematocrit 41.7      MCV 94      MCH 29.8      MCHC 31.7 (*)     RDW 13.7      Platelets 409      MPV 9.7      Immature Granulocytes 0.8 (*)     Gran # (ANC) 6.2      Immature Grans (Abs) 0.06 (*)     Lymph # 1.1      Mono # 0.3      Eos # 0.0      Baso # 0.02      nRBC 0      Gran % 80.4 (*)     Lymph % 13.8 (*)     Mono % 4.4      Eosinophil % 0.3      Basophil % 0.3      Differential Method Automated     URINALYSIS, REFLEX TO URINE CULTURE - Abnormal    Specimen UA Urine, Clean Catch      Color, UA Yellow      Appearance, UA Hazy (*)     pH, UA 6.0      Specific Gravity, UA 1.015      Protein, UA Negative      Glucose, UA Negative      Ketones, UA 1+ (*)     Bilirubin (UA) Negative      Occult Blood UA 1+ (*)     Nitrite, UA Negative      Urobilinogen, UA Negative      Leukocytes, UA 3+ (*)     Narrative:     Specimen " Source->Urine   URINALYSIS MICROSCOPIC - Abnormal    RBC, UA 79 (*)     WBC, UA >100 (*)     WBC Clumps, UA Many (*)     Bacteria Occasional      Squam Epithel, UA 14      Microscopic Comment SEE COMMENT      Narrative:     Specimen Source->Urine   INFLUENZA A & B BY MOLECULAR    Influenza A, Molecular Negative      Influenza B, Molecular Negative      Flu A & B Source Nasal swab     CULTURE, URINE   HEPATITIS C ANTIBODY    Hepatitis C Ab Negative      Narrative:     Release to patient->Immediate   HEP C VIRUS HOLD SPECIMEN    HEP C Virus Hold Specimen Hold for HCV sendout      Narrative:     Release to patient->Immediate   HIV 1 / 2 ANTIBODY    HIV 1/2 Ag/Ab Negative      Narrative:     Release to patient->Immediate   COMPREHENSIVE METABOLIC PANEL    Sodium 141      Potassium 4.0      Chloride 107      CO2 23      Glucose 100      BUN 17      Creatinine 0.7      Calcium 9.9      Total Protein 7.0      Albumin 3.9      Total Bilirubin 0.7      Alkaline Phosphatase 46      AST 12      ALT 11      eGFR >60      Anion Gap 11     TROPONIN I    Troponin I <0.006     TROPONIN I    Troponin I <0.006     B-TYPE NATRIURETIC PEPTIDE    BNP 19     SARS-COV-2 RNA AMPLIFICATION, QUAL    SARS-CoV-2 RNA, Amplification, Qual Negative     POCT GLUCOSE    POCT Glucose 89          All Lab Results:  Results for orders placed or performed during the hospital encounter of 01/06/25   EKG 12-lead    Collection Time: 01/06/25 10:21 AM   Result Value Ref Range    QRS Duration 78 ms    [facility] QTC Calculation 424 ms   POCT glucose    Collection Time: 01/06/25 10:22 AM   Result Value Ref Range    POCT Glucose 89 70 - 110 mg/dL   Hepatitis C Antibody    Collection Time: 01/06/25 11:33 AM   Result Value Ref Range    Hepatitis C Ab Negative Negative   HCV Virus Hold Specimen    Collection Time: 01/06/25 11:33 AM   Result Value Ref Range    HEP C Virus Hold Specimen Hold for HCV sendout    HIV 1/2 Ag/Ab (4th Gen)    Collection Time: 01/06/25 11:33 AM    Result Value Ref Range    HIV 1/2 Ag/Ab Negative Negative   CBC auto differential    Collection Time: 01/06/25 11:33 AM   Result Value Ref Range    WBC 7.75 3.90 - 12.70 K/uL    RBC 4.43 4.00 - 5.40 M/uL    Hemoglobin 13.2 12.0 - 16.0 g/dL    Hematocrit 41.7 37.0 - 48.5 %    MCV 94 82 - 98 fL    MCH 29.8 27.0 - 31.0 pg    MCHC 31.7 (L) 32.0 - 36.0 g/dL    RDW 13.7 11.5 - 14.5 %    Platelets 409 150 - 450 K/uL    MPV 9.7 9.2 - 12.9 fL    Immature Granulocytes 0.8 (H) 0.0 - 0.5 %    Gran # (ANC) 6.2 1.8 - 7.7 K/uL    Immature Grans (Abs) 0.06 (H) 0.00 - 0.04 K/uL    Lymph # 1.1 1.0 - 4.8 K/uL    Mono # 0.3 0.3 - 1.0 K/uL    Eos # 0.0 0.0 - 0.5 K/uL    Baso # 0.02 0.00 - 0.20 K/uL    nRBC 0 0 /100 WBC    Gran % 80.4 (H) 38.0 - 73.0 %    Lymph % 13.8 (L) 18.0 - 48.0 %    Mono % 4.4 4.0 - 15.0 %    Eosinophil % 0.3 0.0 - 8.0 %    Basophil % 0.3 0.0 - 1.9 %    Differential Method Automated    Comprehensive metabolic panel    Collection Time: 01/06/25 11:33 AM   Result Value Ref Range    Sodium 141 136 - 145 mmol/L    Potassium 4.0 3.5 - 5.1 mmol/L    Chloride 107 95 - 110 mmol/L    CO2 23 23 - 29 mmol/L    Glucose 100 70 - 110 mg/dL    BUN 17 6 - 20 mg/dL    Creatinine 0.7 0.5 - 1.4 mg/dL    Calcium 9.9 8.7 - 10.5 mg/dL    Total Protein 7.0 6.0 - 8.4 g/dL    Albumin 3.9 3.5 - 5.2 g/dL    Total Bilirubin 0.7 0.1 - 1.0 mg/dL    Alkaline Phosphatase 46 40 - 150 U/L    AST 12 10 - 40 U/L    ALT 11 10 - 44 U/L    eGFR >60 >60 mL/min/1.73 m^2    Anion Gap 11 8 - 16 mmol/L   Troponin I #1    Collection Time: 01/06/25 11:33 AM   Result Value Ref Range    Troponin I <0.006 0.000 - 0.026 ng/mL   BNP    Collection Time: 01/06/25 11:33 AM   Result Value Ref Range    BNP 19 0 - 99 pg/mL   Troponin I #2    Collection Time: 01/06/25  2:17 PM   Result Value Ref Range    Troponin I <0.006 0.000 - 0.026 ng/mL   Urinalysis, Reflex to Urine Culture Urine, Clean Catch    Collection Time: 01/06/25  3:54 PM    Specimen: Urine, Clean Catch    Result Value Ref Range    Specimen UA Urine, Clean Catch     Color, UA Yellow Yellow, Straw, Liliana    Appearance, UA Hazy (A) Clear    pH, UA 6.0 5.0 - 8.0    Specific Gravity, UA 1.015 1.005 - 1.030    Protein, UA Negative Negative    Glucose, UA Negative Negative    Ketones, UA 1+ (A) Negative    Bilirubin (UA) Negative Negative    Occult Blood UA 1+ (A) Negative    Nitrite, UA Negative Negative    Urobilinogen, UA Negative <2.0 EU/dL    Leukocytes, UA 3+ (A) Negative   Urinalysis Microscopic    Collection Time: 01/06/25  3:54 PM   Result Value Ref Range    RBC, UA 79 (H) 0 - 4 /hpf    WBC, UA >100 (H) 0 - 5 /hpf    WBC Clumps, UA Many (A) None-Rare    Bacteria Occasional None-Occ /hpf    Squam Epithel, UA 14 /hpf    Microscopic Comment SEE COMMENT    Influenza A & B by Molecular    Collection Time: 01/06/25  5:17 PM    Specimen: Nasopharyngeal Swab   Result Value Ref Range    Influenza A, Molecular Negative Negative    Influenza B, Molecular Negative Negative    Flu A & B Source Nasal swab    COVID-19 Rapid Screening    Collection Time: 01/06/25  5:17 PM   Result Value Ref Range    SARS-CoV-2 RNA, Amplification, Qual Negative Negative        Imaging Results:  Imaging Results              MRI Brain Without Contrast (Final result)  Result time 01/06/25 19:02:07      Final result by Neeraj Baron MD (01/06/25 19:02:07)                   Impression:      No definite acute abnormality identified.  Complete findings as above.      Electronically signed by: Neeraj Baron  Date:    01/06/2025  Time:    19:02               Narrative:    EXAMINATION:  MRI BRAIN WITHOUT CONTRAST    CLINICAL HISTORY:  Dizziness, non-specific;.    TECHNIQUE:  Multiplanar multisequence MR imaging of the brain was performed without contrast.    COMPARISON:  None    FINDINGS:  Intracranial compartment:    Ventricles and sulci are normal in size for age without evidence of hydrocephalus. No extra-axial blood or fluid collections.    Tiny  area of DWI hyperintensity and ADC hyperintensity on series 5, image 21 with suspected associated T2 FLAIR hyperintensity.  This could be late subacute to chronic infarct.  This area is in the left frontal lobe, atypical for dizziness.  No mass lesion, acute hemorrhage, edema or acute infarct.    Normal vascular flow voids are preserved.    Skull/extracranial contents (limited evaluation): Bone marrow signal intensity is normal.                                       CT Head Without Contrast (Final result)  Result time 01/06/25 13:48:07      Final result by Elias Davis MD (01/06/25 13:48:07)                   Impression:      No acute intracranial findings. Fluid in the mastoid air cells.    All CT scans at this facility are performed  using dose modulation techniques as appropriate to performed exam including the following:  automated exposure control; adjustment of mA and/or kV according to the patients size (this includes techniques or standardized protocols for targeted exams where dose is matched to indication/reason for exam: i.e. extremities or head);  iterative reconstruction technique.      Electronically signed by: Elias Davis  Date:    01/06/2025  Time:    13:48               Narrative:    EXAMINATION:  CT HEAD WITHOUT CONTRAST    CLINICAL HISTORY:  Dizziness, persistent/recurrent, cardiac or vascular cause suspected;    FINDINGS:  No intracranial hemorrhage or acute findings are demonstrated. The visualized paranasal sinuses are clear. The calvarium is intact. Fluid mastoid air cells.                                       X-Ray Chest AP Portable (Final result)  Result time 01/06/25 11:26:50      Final result by Elias Davis MD (01/06/25 11:26:50)                   Impression:      No acute findings.      Electronically signed by: Elias Davis  Date:    01/06/2025  Time:    11:26               Narrative:    EXAMINATION:  XR CHEST AP PORTABLE    CLINICAL HISTORY:  Chest Pain;    TECHNIQUE:  Portable chest  x-ray    COMPARISON:  07/13/2019 and 03/03/2017    FINDINGS:  Lung fields are clear.    No pleural fluid or pneumothorax.    No adenopathy is identified.    No significant osseous abnormality.                                       The EKG was ordered, reviewed, and independently interpreted by the ED provider.  Interpretation time: 10:21  Rate: 65 BPM  Rhythm: normal sinus rhythm  Interpretation: No acute ST changes. No STEMI.           The Emergency Provider reviewed the vital signs and test results, which are outlined above.     ED Discussion     1:07 PM: Re-evaluated pt. Pt states she feels dizziness when she turns her head. Added Valium and ordered head CT.      4:50 PM: Re-evaluated pt. Pt states she still feels dizzy when she got up to use the restroom. Ordered MRI.    7:46 PM: Pt is able to stand up and take a few steps. She feels like she can go home although feeling a little lightheaded since her brother will stay over night to check-on the pt. CT head and MRI showed no acute findings. Pt referred to neurology and placed on Meclizine.    7:46 PM: Reassessed pt at this time.  Discussed with pt all pertinent ED information and results. Discussed pt dx and plan of tx. Gave pt all f/u and return to the ED instructions. All questions and concerns were addressed at this time. Pt expresses understanding of information and instructions, and is comfortable with plan to discharge. Pt is stable for discharge.    I discussed with patient and/or family/caretaker that evaluation in the ED does not suggest any emergent or life threatening medical conditions requiring immediate intervention beyond what was provided in the ED, and I believe patient is safe for discharge.  Regardless, an unremarkable evaluation in the ED does not preclude the development or presence of a serious of life threatening condition. As such, patient was instructed to return immediately for any worsening or change in current symptoms.          Medical Decision Making  Amount and/or Complexity of Data Reviewed  Labs: ordered. Decision-making details documented in ED Course.  Radiology: ordered. Decision-making details documented in ED Course.  ECG/medicine tests: ordered and independent interpretation performed. Decision-making details documented in ED Course.    Risk  Prescription drug management.  Risk Details: Differential diagnoses: Myocardial infarction, angina, pneumonia, asthma, infection, pneumothorax, pericarditis , TIA, CVA, intracranial hemorrhage, vertigo, BPPV, otitis media, sinusitis                ED Medication(s):  Medications   ondansetron injection 4 mg (4 mg Intravenous Given 1/6/25 1136)   meclizine tablet 25 mg (25 mg Oral Given 1/6/25 1135)   sodium chloride 0.9% bolus 1,000 mL 1,000 mL (0 mLs Intravenous Stopped 1/6/25 1437)   diazePAM tablet 5 mg (5 mg Oral Given 1/6/25 1313)   cefTRIAXone injection 1 g (1 g Intravenous Given 1/6/25 1710)   LORazepam injection 1 mg (1 mg Intravenous Given 1/6/25 1740)   sodium chloride 0.9% bolus 1,000 mL 1,000 mL (0 mLs Intravenous Stopped 1/6/25 1934)       New Prescriptions    MECLIZINE (ANTIVERT) 25 MG TABLET    Take 1 tablet (25 mg total) by mouth 3 (three) times daily as needed.        Follow-up Information       Please follow up.    Contact information:  Referral to Neurology has been done for you                               Scribe Attestation:   Scribe #1: I performed the above scribed service and the documentation accurately describes the services I performed. I attest to the accuracy of the note.     Attending:   Physician Attestation Statement for Scribe #1: I, Kassie Blood MD, personally performed the services described in this documentation, as scribed by Angella John, in my presence, and it is both accurate and complete.           Clinical Impression       ICD-10-CM ICD-9-CM   1. Vertigo  R42 780.4   2. Weakness  R53.1 780.79   3. Chest pain  R07.9 786.50        Disposition:   Disposition: Discharged  Condition: Stable        aKssie Blood MD  01/06/25 2014

## 2025-01-07 NOTE — DISCHARGE INSTRUCTIONS
Meclizine has been prescribed for the vertigo symptoms    Return to emergency department if you develop:  - Sustained Fever >100.4 or low temperature <96.8   - Tachycardia (very high heart rate) or Pulse >90  - Hypotension (low blood pressure) a top number (systolic pressure) of <90 or a bottom number (diastolic pressure) of <40 or lower  - Hypertension (high blood pressure) a top number (systolic pressure) of >180 or a bottom number (diastolic pressure) of >120 or higher  - Severe pain not relieved with recommended pain control regimen  - Severe shortness of breath above baseline  - Severe nausea, vomiting, inability to tolerate oral feeding/hydration  - Altered mental status, abnormal interaction or behaviors   - If symptoms acutely worsen or do not improve  - Development of other worrisome symptom

## 2025-01-08 LAB
BACTERIA UR CULT: NORMAL
BACTERIA UR CULT: NORMAL

## (undated) DEVICE — SEE MEDLINE ITEM 157027

## (undated) DEVICE — SUT ETHILON 3-0 PS2 18 BLK

## (undated) DEVICE — COVER OVERHEAD SURG LT BLUE

## (undated) DEVICE — GAUZE SPONGE 4X4 12PLY

## (undated) DEVICE — SEE MEDLINE ITEM 157131

## (undated) DEVICE — SEE MEDLINE ITEM 157173

## (undated) DEVICE — SEE MEDLINE ITEM 146308

## (undated) DEVICE — PAD CAST SPECIALIST STRL 4

## (undated) DEVICE — INSTRUMENT FRAZIER 10FR W/VENT

## (undated) DEVICE — SEE MEDLINE ITEM 152622

## (undated) DEVICE — GLOVE SURGICAL LATEX SZ 8

## (undated) DEVICE — CONTAINER SPECIMEN STRL 4OZ

## (undated) DEVICE — SEE MEDLINE ITEM 152522

## (undated) DEVICE — ELECTRODE REM PLYHSV RETURN 9

## (undated) DEVICE — DRAPE PLASTIC U 60X72

## (undated) DEVICE — DRAPE MOBILE C-ARM

## (undated) DEVICE — NDL SAFETY 25G X 1.5 ECLIPSE

## (undated) DEVICE — SEE MEDLINE ITEM 157216

## (undated) DEVICE — UNDERGLOVES BIOGEL PI SIZE 8.5

## (undated) DEVICE — BANDAGE ELASTIC 2X5 VELCRO ST

## (undated) DEVICE — TOURNIQUET SB QC DP 18X4IN

## (undated) DEVICE — SEE MEDLINE ITEM 157117

## (undated) DEVICE — LINER GLOVE POWDERFREE 8

## (undated) DEVICE — SCRUB 10% POVIDONE IODINE 4OZ

## (undated) DEVICE — TAPE SURGICAL MICROFOAM 3IN

## (undated) DEVICE — GLOVE 8.5 PROTEXIS PI BLUE

## (undated) DEVICE — GLOVE SURG BIOGEL LATEX SZ 7.5

## (undated) DEVICE — SPLINT PLASTER FAST SET 5X30IN

## (undated) DEVICE — ALCOHOL 70% ISOP RUBBING 4OZ

## (undated) DEVICE — GLOVE PROTEXIS HYDROGEL SZ8

## (undated) DEVICE — SYR 10CC LUER LOCK

## (undated) DEVICE — SEE MEDLINE ITEM 152529

## (undated) DEVICE — GLOVE PROTEXIS HYDROGEL SZ7.5

## (undated) DEVICE — DECANTER VIAL ASEPTIC TRANSFER

## (undated) DEVICE — APPLICATOR CHLORAPREP ORN 26ML

## (undated) DEVICE — MANIFOLD 4 PORT